# Patient Record
Sex: FEMALE | Race: WHITE | NOT HISPANIC OR LATINO | Employment: UNEMPLOYED | URBAN - METROPOLITAN AREA
[De-identification: names, ages, dates, MRNs, and addresses within clinical notes are randomized per-mention and may not be internally consistent; named-entity substitution may affect disease eponyms.]

---

## 2023-10-03 ENCOUNTER — OFFICE VISIT (OUTPATIENT)
Dept: PRIMARY CARE | Facility: CLINIC | Age: 83
End: 2023-10-03
Payer: MEDICARE

## 2023-10-03 DIAGNOSIS — I48.91 NEW ONSET ATRIAL FIBRILLATION (MULTI): ICD-10-CM

## 2023-10-03 LAB
POC INR: 3 (ref 0.9–1.1)
POC PROTHROMBIN TIME: 36.1 (ref 9.3–12.5)

## 2023-10-03 PROCEDURE — 99212 OFFICE O/P EST SF 10 MIN: CPT

## 2023-10-03 PROCEDURE — 85610 PROTHROMBIN TIME: CPT

## 2023-10-03 RX ORDER — WARFARIN 6 MG/1
6 TABLET ORAL 3 TIMES WEEKLY
COMMUNITY
End: 2023-11-09 | Stop reason: SDUPTHER

## 2023-10-03 RX ORDER — WARFARIN 3 MG/1
3 TABLET ORAL
COMMUNITY
Start: 2023-07-07 | End: 2023-10-16 | Stop reason: SDUPTHER

## 2023-10-03 NOTE — PROGRESS NOTES
Continue current dose of 6 mg every Sunday Tuesday and Friday and 3 mg all other days  
[Negative] : Endocrine

## 2023-10-12 DIAGNOSIS — I48.0 PAROXYSMAL ATRIAL FIBRILLATION (MULTI): ICD-10-CM

## 2023-10-16 RX ORDER — WARFARIN 3 MG/1
3 TABLET ORAL
Qty: 16 TABLET | Refills: 2 | Status: SHIPPED | OUTPATIENT
Start: 2023-10-17 | End: 2023-11-09 | Stop reason: SDUPTHER

## 2023-10-30 PROBLEM — I48.0 PAROXYSMAL ATRIAL FIBRILLATION (MULTI): Status: ACTIVE | Noted: 2023-10-30

## 2023-10-30 PROBLEM — R89.9 ABNORMAL LABORATORY TEST RESULT: Status: ACTIVE | Noted: 2023-10-30

## 2023-10-30 PROBLEM — Z90.710 STATUS POST HYSTERECTOMY: Status: ACTIVE | Noted: 2023-10-30

## 2023-10-30 PROBLEM — E78.5 HYPERLIPIDEMIA: Status: ACTIVE | Noted: 2023-10-30

## 2023-10-30 PROBLEM — E55.9 VITAMIN D DEFICIENCY: Status: ACTIVE | Noted: 2023-10-30

## 2023-10-30 RX ORDER — ALBUTEROL SULFATE 90 UG/1
1 AEROSOL, METERED RESPIRATORY (INHALATION) EVERY 4 HOURS PRN
COMMUNITY

## 2023-10-30 RX ORDER — ACETAMINOPHEN 325 MG/1
2 TABLET ORAL EVERY 6 HOURS PRN
COMMUNITY
End: 2024-05-14 | Stop reason: HOSPADM

## 2023-10-30 RX ORDER — DEXTROMETHORPHAN HYDROBROMIDE, GUAIFENESIN 5; 100 MG/5ML; MG/5ML
2 LIQUID ORAL EVERY 12 HOURS
COMMUNITY
End: 2024-05-14 | Stop reason: HOSPADM

## 2023-10-30 RX ORDER — PREDNISONE 10 MG/1
TABLET ORAL
COMMUNITY
Start: 2023-03-31 | End: 2024-05-13 | Stop reason: ENTERED-IN-ERROR

## 2023-10-30 RX ORDER — METOPROLOL TARTRATE 25 MG/1
0.5 TABLET, FILM COATED ORAL 2 TIMES DAILY
COMMUNITY

## 2023-10-30 RX ORDER — FLUTICASONE FUROATE AND VILANTEROL TRIFENATATE 200; 25 UG/1; UG/1
1 POWDER RESPIRATORY (INHALATION) DAILY
COMMUNITY

## 2023-10-30 RX ORDER — TIOTROPIUM BROMIDE INHALATION SPRAY 3.12 UG/1
SPRAY, METERED RESPIRATORY (INHALATION)
COMMUNITY
Start: 2023-03-31 | End: 2024-05-13 | Stop reason: ENTERED-IN-ERROR

## 2023-10-30 RX ORDER — TRIFLUOPERAZINE HYDROCHLORIDE 5 MG/1
TABLET, FILM COATED ORAL EVERY 24 HOURS
COMMUNITY

## 2023-10-30 RX ORDER — GABAPENTIN 300 MG/1
1 CAPSULE ORAL 2 TIMES DAILY
COMMUNITY

## 2023-10-30 RX ORDER — NORTRIPTYLINE HYDROCHLORIDE 25 MG/1
1 CAPSULE ORAL NIGHTLY
COMMUNITY

## 2023-10-30 RX ORDER — VENLAFAXINE HCL 37.5 MG
1 CAPSULE, EXT RELEASE 24 HR ORAL DAILY
COMMUNITY
End: 2023-11-09 | Stop reason: ALTCHOICE

## 2023-10-31 ENCOUNTER — OFFICE VISIT (OUTPATIENT)
Dept: PRIMARY CARE | Facility: CLINIC | Age: 83
End: 2023-10-31
Payer: MEDICARE

## 2023-10-31 DIAGNOSIS — I48.0 PAROXYSMAL ATRIAL FIBRILLATION (MULTI): ICD-10-CM

## 2023-10-31 LAB
POC INR: 2.2 (ref 0.9–1.1)
POC PROTHROMBIN TIME: 22 (ref 9.3–12.5)

## 2023-10-31 PROCEDURE — 99024 POSTOP FOLLOW-UP VISIT: CPT

## 2023-10-31 RX ORDER — WARFARIN 3 MG/1
3 TABLET ORAL
Qty: 16 TABLET | Refills: 2 | Status: CANCELLED | OUTPATIENT
Start: 2023-10-31 | End: 2024-01-29

## 2023-11-09 ENCOUNTER — OFFICE VISIT (OUTPATIENT)
Dept: PRIMARY CARE | Facility: CLINIC | Age: 83
End: 2023-11-09
Payer: MEDICARE

## 2023-11-09 ENCOUNTER — TELEPHONE (OUTPATIENT)
Dept: PRIMARY CARE | Facility: CLINIC | Age: 83
End: 2023-11-09
Payer: MEDICARE

## 2023-11-09 DIAGNOSIS — I48.0 PAROXYSMAL ATRIAL FIBRILLATION (MULTI): ICD-10-CM

## 2023-11-09 LAB
POC INR: 2.9 (ref 0.9–1.1)
POC PROTHROMBIN TIME: 34.4 (ref 9.3–12.5)

## 2023-11-09 PROCEDURE — 99024 POSTOP FOLLOW-UP VISIT: CPT

## 2023-11-09 RX ORDER — WARFARIN 6 MG/1
6 TABLET ORAL 3 TIMES WEEKLY
Qty: 36 TABLET | Refills: 0 | Status: SHIPPED | OUTPATIENT
Start: 2023-11-10 | End: 2023-12-01

## 2023-11-09 RX ORDER — WARFARIN 3 MG/1
3 TABLET ORAL
Qty: 48 TABLET | Refills: 0 | Status: SHIPPED | OUTPATIENT
Start: 2023-11-09 | End: 2023-12-14

## 2023-11-09 NOTE — TELEPHONE ENCOUNTER
Patient currently takes 6 mg Mon, Wed, Thur and 3 mg all other days. Please advise. Please see Rx tab. Correct pharmacy selected above.   Perilesional Excision Additional Text (Leave Blank If You Do Not Want): The margin was drawn around the clinically apparent lesion. Incisions were then made along these lines to the appropriate tissue plane and the lesion was extirpated.

## 2023-11-28 ENCOUNTER — APPOINTMENT (OUTPATIENT)
Dept: PRIMARY CARE | Facility: CLINIC | Age: 83
End: 2023-11-28
Payer: MEDICARE

## 2023-11-29 DIAGNOSIS — R42 DIZZINESS: Primary | ICD-10-CM

## 2023-11-29 RX ORDER — MECLIZINE HYDROCHLORIDE 25 MG/1
25 TABLET ORAL 3 TIMES DAILY PRN
COMMUNITY
End: 2023-11-29 | Stop reason: SDUPTHER

## 2023-11-30 RX ORDER — MECLIZINE HYDROCHLORIDE 25 MG/1
25 TABLET ORAL DAILY PRN
Qty: 30 TABLET | Refills: 1 | Status: SHIPPED | OUTPATIENT
Start: 2023-11-30 | End: 2023-12-01 | Stop reason: SDUPTHER

## 2023-12-01 ENCOUNTER — APPOINTMENT (OUTPATIENT)
Dept: PRIMARY CARE | Facility: CLINIC | Age: 83
End: 2023-12-01
Payer: MEDICARE

## 2023-12-01 ENCOUNTER — CLINICAL SUPPORT (OUTPATIENT)
Dept: PRIMARY CARE | Facility: CLINIC | Age: 83
End: 2023-12-01
Payer: MEDICARE

## 2023-12-01 DIAGNOSIS — I48.0 PAROXYSMAL ATRIAL FIBRILLATION (MULTI): ICD-10-CM

## 2023-12-01 DIAGNOSIS — R42 DIZZINESS: ICD-10-CM

## 2023-12-01 LAB
POC INR: 3.8 (ref 0.9–1.1)
POC PROTHROMBIN TIME: 45.4 (ref 9.3–12.5)

## 2023-12-01 RX ORDER — WARFARIN 6 MG/1
TABLET ORAL
Qty: 36 TABLET | Refills: 3 | Status: SHIPPED | OUTPATIENT
Start: 2023-12-01

## 2023-12-01 RX ORDER — MECLIZINE HYDROCHLORIDE 25 MG/1
25 TABLET ORAL DAILY PRN
Qty: 90 TABLET | Refills: 1 | Status: SHIPPED | OUTPATIENT
Start: 2023-12-01 | End: 2024-05-29

## 2023-12-14 DIAGNOSIS — I48.0 PAROXYSMAL ATRIAL FIBRILLATION (MULTI): ICD-10-CM

## 2023-12-14 RX ORDER — WARFARIN 3 MG/1
TABLET ORAL
Qty: 48 TABLET | Refills: 3 | Status: SHIPPED | OUTPATIENT
Start: 2023-12-14

## 2024-02-15 ENCOUNTER — APPOINTMENT (OUTPATIENT)
Dept: PRIMARY CARE | Facility: CLINIC | Age: 84
End: 2024-02-15
Payer: MEDICARE

## 2024-02-27 ENCOUNTER — TELEPHONE (OUTPATIENT)
Dept: PRIMARY CARE | Facility: CLINIC | Age: 84
End: 2024-02-27

## 2024-02-27 ENCOUNTER — CLINICAL SUPPORT (OUTPATIENT)
Dept: PRIMARY CARE | Facility: CLINIC | Age: 84
End: 2024-02-27
Payer: MEDICARE

## 2024-02-27 DIAGNOSIS — I48.0 PAROXYSMAL ATRIAL FIBRILLATION (MULTI): ICD-10-CM

## 2024-02-27 LAB
POC INR: 2.5 (ref 0.9–1.1)
POC PROTHROMBIN TIME: 29.5 (ref 9.3–12.5)

## 2024-02-27 PROCEDURE — 85610 PROTHROMBIN TIME: CPT | Mod: QW

## 2024-02-27 PROCEDURE — 85610 PROTHROMBIN TIME: CPT

## 2024-02-27 NOTE — TELEPHONE ENCOUNTER
Patient here today for PT/INR check. I have documented for the nurse visit. Her INR is 2.5. She is currently taking 6 mg Mon, Wed, Thur and 3 mg all other days. I advised her and her daughter to continue the current dosage.

## 2024-02-28 NOTE — TELEPHONE ENCOUNTER
I spoke with the patient's  and let him know. He said that he will get her in as close to a week as possible because it depends on how she is feeling.

## 2024-03-07 ENCOUNTER — CLINICAL SUPPORT (OUTPATIENT)
Dept: PRIMARY CARE | Facility: CLINIC | Age: 84
End: 2024-03-07
Payer: MEDICARE

## 2024-03-07 ENCOUNTER — TELEPHONE (OUTPATIENT)
Dept: PRIMARY CARE | Facility: CLINIC | Age: 84
End: 2024-03-07

## 2024-03-07 DIAGNOSIS — I48.0 PAROXYSMAL ATRIAL FIBRILLATION (MULTI): ICD-10-CM

## 2024-03-07 LAB
POC INR: 3.8 (ref 0.9–1.1)
POC PROTHROMBIN TIME: 45.1 (ref 9.3–12.5)

## 2024-03-07 PROCEDURE — 85610 PROTHROMBIN TIME: CPT

## 2024-03-07 PROCEDURE — 85610 PROTHROMBIN TIME: CPT | Mod: QW

## 2024-03-07 NOTE — TELEPHONE ENCOUNTER
Patient's current today is 3.8. Current dosage of coumadin is 6 mg M, W, Th and 3 mg Tue, Fri, Sat, Sun.

## 2024-03-15 ENCOUNTER — CLINICAL SUPPORT (OUTPATIENT)
Dept: PRIMARY CARE | Facility: CLINIC | Age: 84
End: 2024-03-15
Payer: MEDICARE

## 2024-03-15 ENCOUNTER — TELEPHONE (OUTPATIENT)
Dept: PRIMARY CARE | Facility: CLINIC | Age: 84
End: 2024-03-15

## 2024-03-15 DIAGNOSIS — I48.0 PAROXYSMAL ATRIAL FIBRILLATION (MULTI): ICD-10-CM

## 2024-03-15 LAB
POC INR: 2 (ref 0.9–1.1)
POC PROTHROMBIN TIME: 23.9 (ref 9.3–12.5)

## 2024-03-15 PROCEDURE — 85610 PROTHROMBIN TIME: CPT

## 2024-03-15 PROCEDURE — 85610 PROTHROMBIN TIME: CPT | Mod: QW

## 2024-03-15 NOTE — PROGRESS NOTES
PATIENT CAME IN FOR INR CHECK . LAST WEEK HER INR WAS 3.8 SHE STATED SHE WAS NOT NOTIFIED BY THE OFFICE WHAT TO DO, SO HER  DECIDED TO HOLD HER DOSE FRIDAY AND SAY.

## 2024-03-27 ENCOUNTER — CLINICAL SUPPORT (OUTPATIENT)
Dept: PRIMARY CARE | Facility: CLINIC | Age: 84
End: 2024-03-27
Payer: MEDICARE

## 2024-03-27 DIAGNOSIS — I48.0 PAROXYSMAL ATRIAL FIBRILLATION (MULTI): ICD-10-CM

## 2024-03-27 LAB — POC INR: 3.7 (ref 0.9–1.1)

## 2024-03-27 PROCEDURE — 85610 PROTHROMBIN TIME: CPT

## 2024-03-27 PROCEDURE — 85610 PROTHROMBIN TIME: CPT | Mod: QW

## 2024-04-04 ENCOUNTER — CLINICAL SUPPORT (OUTPATIENT)
Dept: PRIMARY CARE | Facility: CLINIC | Age: 84
End: 2024-04-04
Payer: MEDICARE

## 2024-04-04 DIAGNOSIS — I48.0 PAROXYSMAL ATRIAL FIBRILLATION (MULTI): ICD-10-CM

## 2024-04-04 LAB
POC INR: 2.8 (ref 0.9–1.1)
POC PROTHROMBIN TIME: 33.2 (ref 9.3–12.5)

## 2024-04-04 PROCEDURE — 85610 PROTHROMBIN TIME: CPT | Mod: QW

## 2024-04-04 PROCEDURE — 85610 PROTHROMBIN TIME: CPT

## 2024-04-11 ENCOUNTER — APPOINTMENT (OUTPATIENT)
Dept: PRIMARY CARE | Facility: CLINIC | Age: 84
End: 2024-04-11
Payer: MEDICARE

## 2024-04-11 ENCOUNTER — TELEPHONE (OUTPATIENT)
Dept: PRIMARY CARE | Facility: CLINIC | Age: 84
End: 2024-04-11

## 2024-04-11 DIAGNOSIS — I48.0 PAROXYSMAL ATRIAL FIBRILLATION (MULTI): ICD-10-CM

## 2024-04-11 LAB
POC INR: 3.3 (ref 0.9–1.1)
POC PROTHROMBIN TIME: 39.9 (ref 9.3–12.5)

## 2024-04-11 PROCEDURE — 85610 PROTHROMBIN TIME: CPT

## 2024-04-11 NOTE — TELEPHONE ENCOUNTER
PT/INR today is 3.3 Current dosage is 6 mg Mon, Wed, Thu and 3 mg Tue, Fri, Sat, Sun. She held one single 6 mg dosage last week as instructed. Please advise.   show

## 2024-04-18 ENCOUNTER — CLINICAL SUPPORT (OUTPATIENT)
Dept: PRIMARY CARE | Facility: CLINIC | Age: 84
End: 2024-04-18
Payer: MEDICARE

## 2024-04-18 DIAGNOSIS — I48.0 PAROXYSMAL ATRIAL FIBRILLATION (MULTI): ICD-10-CM

## 2024-04-18 LAB
POC INR: 3.2 (ref 0.9–1.1)
POC PROTHROMBIN TIME: 38 (ref 9.3–12.5)

## 2024-04-18 PROCEDURE — 85610 PROTHROMBIN TIME: CPT

## 2024-04-18 PROCEDURE — 85610 PROTHROMBIN TIME: CPT | Mod: QW

## 2024-04-25 ENCOUNTER — OFFICE VISIT (OUTPATIENT)
Dept: PRIMARY CARE | Facility: CLINIC | Age: 84
End: 2024-04-25
Payer: MEDICARE

## 2024-04-25 DIAGNOSIS — I48.0 PAROXYSMAL ATRIAL FIBRILLATION (MULTI): ICD-10-CM

## 2024-04-25 LAB
POC INR: 2.7 (ref 0.9–1.1)
POC PROTHROMBIN TIME: 32.8 (ref 9.3–12.5)

## 2024-04-25 PROCEDURE — 1157F ADVNC CARE PLAN IN RCRD: CPT

## 2024-04-25 PROCEDURE — 99024 POSTOP FOLLOW-UP VISIT: CPT

## 2024-04-25 PROCEDURE — 85610 PROTHROMBIN TIME: CPT

## 2024-04-25 PROCEDURE — 85610 PROTHROMBIN TIME: CPT | Mod: QW

## 2024-05-02 ENCOUNTER — LAB (OUTPATIENT)
Dept: LAB | Facility: LAB | Age: 84
End: 2024-05-02
Payer: MEDICARE

## 2024-05-02 ENCOUNTER — OFFICE VISIT (OUTPATIENT)
Dept: PRIMARY CARE | Facility: CLINIC | Age: 84
End: 2024-05-02
Payer: MEDICARE

## 2024-05-02 DIAGNOSIS — I48.0 PAROXYSMAL ATRIAL FIBRILLATION (MULTI): ICD-10-CM

## 2024-05-02 DIAGNOSIS — Z79.01 LONG TERM CURRENT USE OF ANTICOAGULANT: ICD-10-CM

## 2024-05-02 LAB
INR PPP: 2.9 (ref 0.9–1.2)
PROTHROMBIN TIME: 28.7 SECONDS (ref 9.3–12.7)

## 2024-05-02 PROCEDURE — 99024 POSTOP FOLLOW-UP VISIT: CPT

## 2024-05-02 PROCEDURE — 1157F ADVNC CARE PLAN IN RCRD: CPT

## 2024-05-02 PROCEDURE — 85610 PROTHROMBIN TIME: CPT

## 2024-05-02 PROCEDURE — 36415 COLL VENOUS BLD VENIPUNCTURE: CPT

## 2024-05-09 ENCOUNTER — OFFICE VISIT (OUTPATIENT)
Dept: PRIMARY CARE | Facility: CLINIC | Age: 84
End: 2024-05-09
Payer: MEDICARE

## 2024-05-09 DIAGNOSIS — N18.30 STAGE 3 CHRONIC KIDNEY DISEASE, UNSPECIFIED WHETHER STAGE 3A OR 3B CKD (MULTI): ICD-10-CM

## 2024-05-09 DIAGNOSIS — I48.0 PAROXYSMAL ATRIAL FIBRILLATION (MULTI): ICD-10-CM

## 2024-05-09 DIAGNOSIS — E66.01 OBESITY, MORBID (MULTI): ICD-10-CM

## 2024-05-09 DIAGNOSIS — Z79.01 LONG TERM CURRENT USE OF ANTICOAGULANT: ICD-10-CM

## 2024-05-09 DIAGNOSIS — F20.9 SCHIZOPHRENIA, UNSPECIFIED TYPE (MULTI): Primary | ICD-10-CM

## 2024-05-09 LAB
POC INR: 3 (ref 0.9–1.1)
POC PROTHROMBIN TIME: 36.3 (ref 9.3–12.5)

## 2024-05-09 PROCEDURE — 85610 PROTHROMBIN TIME: CPT | Mod: QW

## 2024-05-09 PROCEDURE — 99024 POSTOP FOLLOW-UP VISIT: CPT

## 2024-05-09 PROCEDURE — 1157F ADVNC CARE PLAN IN RCRD: CPT

## 2024-05-09 PROCEDURE — 85610 PROTHROMBIN TIME: CPT

## 2024-05-12 ENCOUNTER — APPOINTMENT (OUTPATIENT)
Dept: RADIOLOGY | Facility: HOSPITAL | Age: 84
End: 2024-05-12
Payer: MEDICARE

## 2024-05-12 ENCOUNTER — APPOINTMENT (OUTPATIENT)
Dept: CARDIOLOGY | Facility: HOSPITAL | Age: 84
End: 2024-05-12
Payer: MEDICARE

## 2024-05-12 ENCOUNTER — HOSPITAL ENCOUNTER (OUTPATIENT)
Facility: HOSPITAL | Age: 84
Setting detail: OBSERVATION
Discharge: HOME HEALTH | End: 2024-05-14
Attending: EMERGENCY MEDICINE | Admitting: INTERNAL MEDICINE
Payer: MEDICARE

## 2024-05-12 DIAGNOSIS — N18.30 STAGE 3 CHRONIC KIDNEY DISEASE, UNSPECIFIED WHETHER STAGE 3A OR 3B CKD (MULTI): ICD-10-CM

## 2024-05-12 DIAGNOSIS — E66.01 OBESITY, MORBID (MULTI): ICD-10-CM

## 2024-05-12 DIAGNOSIS — R42 VERTIGO: ICD-10-CM

## 2024-05-12 DIAGNOSIS — N30.00 ACUTE CYSTITIS WITHOUT HEMATURIA: ICD-10-CM

## 2024-05-12 DIAGNOSIS — R51.9 NONINTRACTABLE HEADACHE, UNSPECIFIED CHRONICITY PATTERN, UNSPECIFIED HEADACHE TYPE: Primary | ICD-10-CM

## 2024-05-12 DIAGNOSIS — N17.9 AKI (ACUTE KIDNEY INJURY) (CMS-HCC): ICD-10-CM

## 2024-05-12 DIAGNOSIS — N28.9 ACUTE RENAL INSUFFICIENCY: ICD-10-CM

## 2024-05-12 LAB
ALBUMIN SERPL-MCNC: 4.1 G/DL (ref 3.5–5)
ALP BLD-CCNC: 100 U/L (ref 35–125)
ALT SERPL-CCNC: 18 U/L (ref 5–40)
ANION GAP SERPL CALC-SCNC: 16 MMOL/L
APPEARANCE UR: ABNORMAL
APTT PPP: 43.8 SECONDS (ref 22–32.5)
AST SERPL-CCNC: 28 U/L (ref 5–40)
BASOPHILS # BLD AUTO: 0.07 X10*3/UL (ref 0–0.1)
BASOPHILS NFR BLD AUTO: 0.6 %
BILIRUB SERPL-MCNC: 0.4 MG/DL (ref 0.1–1.2)
BILIRUB UR STRIP.AUTO-MCNC: NEGATIVE MG/DL
BUN SERPL-MCNC: 16 MG/DL (ref 8–25)
CALCIUM SERPL-MCNC: 9.4 MG/DL (ref 8.5–10.4)
CAOX CRY #/AREA UR COMP ASSIST: ABNORMAL /HPF
CHLORIDE SERPL-SCNC: 101 MMOL/L (ref 97–107)
CO2 SERPL-SCNC: 25 MMOL/L (ref 24–31)
COLOR UR: YELLOW
CREAT SERPL-MCNC: 1.8 MG/DL (ref 0.4–1.6)
EGFRCR SERPLBLD CKD-EPI 2021: 28 ML/MIN/1.73M*2
EOSINOPHIL # BLD AUTO: 0.06 X10*3/UL (ref 0–0.4)
EOSINOPHIL NFR BLD AUTO: 0.5 %
ERYTHROCYTE [DISTWIDTH] IN BLOOD BY AUTOMATED COUNT: 15.9 % (ref 11.5–14.5)
GLUCOSE SERPL-MCNC: 147 MG/DL (ref 65–99)
GLUCOSE UR STRIP.AUTO-MCNC: NORMAL MG/DL
HCT VFR BLD AUTO: 46.8 % (ref 36–46)
HGB BLD-MCNC: 14.5 G/DL (ref 12–16)
HYALINE CASTS #/AREA URNS AUTO: ABNORMAL /LPF
IMM GRANULOCYTES # BLD AUTO: 0.05 X10*3/UL (ref 0–0.5)
IMM GRANULOCYTES NFR BLD AUTO: 0.4 % (ref 0–0.9)
INR PPP: 3.1 (ref 0.9–1.2)
KETONES UR STRIP.AUTO-MCNC: NEGATIVE MG/DL
LACTATE BLDV-SCNC: 1.6 MMOL/L (ref 0.4–2)
LACTATE BLDV-SCNC: 2.6 MMOL/L (ref 0.4–2)
LEUKOCYTE ESTERASE UR QL STRIP.AUTO: ABNORMAL
LYMPHOCYTES # BLD AUTO: 0.98 X10*3/UL (ref 0.8–3)
LYMPHOCYTES NFR BLD AUTO: 8.6 %
MCH RBC QN AUTO: 29.3 PG (ref 26–34)
MCHC RBC AUTO-ENTMCNC: 31 G/DL (ref 32–36)
MCV RBC AUTO: 95 FL (ref 80–100)
MONOCYTES # BLD AUTO: 0.31 X10*3/UL (ref 0.05–0.8)
MONOCYTES NFR BLD AUTO: 2.7 %
MUCOUS THREADS #/AREA URNS AUTO: ABNORMAL /LPF
NEUTROPHILS # BLD AUTO: 9.86 X10*3/UL (ref 1.6–5.5)
NEUTROPHILS NFR BLD AUTO: 87.2 %
NITRITE UR QL STRIP.AUTO: NEGATIVE
NRBC BLD-RTO: 0 /100 WBCS (ref 0–0)
PH UR STRIP.AUTO: 5.5 [PH]
PLATELET # BLD AUTO: 431 X10*3/UL (ref 150–450)
POTASSIUM SERPL-SCNC: 3.7 MMOL/L (ref 3.4–5.1)
PROT SERPL-MCNC: 7.1 G/DL (ref 5.9–7.9)
PROT UR STRIP.AUTO-MCNC: ABNORMAL MG/DL
PROTHROMBIN TIME: 30.3 SECONDS (ref 9.3–12.7)
RBC # BLD AUTO: 4.95 X10*6/UL (ref 4–5.2)
RBC # UR STRIP.AUTO: NEGATIVE /UL
RBC #/AREA URNS AUTO: ABNORMAL /HPF
SODIUM SERPL-SCNC: 142 MMOL/L (ref 133–145)
SP GR UR STRIP.AUTO: 1.03
SQUAMOUS #/AREA URNS AUTO: ABNORMAL /HPF
TROPONIN T SERPL-MCNC: 19 NG/L
TROPONIN T SERPL-MCNC: 26 NG/L
UROBILINOGEN UR STRIP.AUTO-MCNC: ABNORMAL MG/DL
WBC # BLD AUTO: 11.3 X10*3/UL (ref 4.4–11.3)
WBC #/AREA URNS AUTO: ABNORMAL /HPF

## 2024-05-12 PROCEDURE — 2500000001 HC RX 250 WO HCPCS SELF ADMINISTERED DRUGS (ALT 637 FOR MEDICARE OP): Performed by: PHYSICIAN ASSISTANT

## 2024-05-12 PROCEDURE — 81001 URINALYSIS AUTO W/SCOPE: CPT | Performed by: PHYSICIAN ASSISTANT

## 2024-05-12 PROCEDURE — 2500000004 HC RX 250 GENERAL PHARMACY W/ HCPCS (ALT 636 FOR OP/ED): Performed by: PHYSICIAN ASSISTANT

## 2024-05-12 PROCEDURE — 85730 THROMBOPLASTIN TIME PARTIAL: CPT | Performed by: PHYSICIAN ASSISTANT

## 2024-05-12 PROCEDURE — 83605 ASSAY OF LACTIC ACID: CPT | Performed by: EMERGENCY MEDICINE

## 2024-05-12 PROCEDURE — 99285 EMERGENCY DEPT VISIT HI MDM: CPT | Mod: 25

## 2024-05-12 PROCEDURE — 84484 ASSAY OF TROPONIN QUANT: CPT | Performed by: PHYSICIAN ASSISTANT

## 2024-05-12 PROCEDURE — 36415 COLL VENOUS BLD VENIPUNCTURE: CPT | Performed by: EMERGENCY MEDICINE

## 2024-05-12 PROCEDURE — 2500000001 HC RX 250 WO HCPCS SELF ADMINISTERED DRUGS (ALT 637 FOR MEDICARE OP): Performed by: INTERNAL MEDICINE

## 2024-05-12 PROCEDURE — G0378 HOSPITAL OBSERVATION PER HR: HCPCS

## 2024-05-12 PROCEDURE — 85025 COMPLETE CBC W/AUTO DIFF WBC: CPT | Performed by: PHYSICIAN ASSISTANT

## 2024-05-12 PROCEDURE — 71045 X-RAY EXAM CHEST 1 VIEW: CPT

## 2024-05-12 PROCEDURE — 96375 TX/PRO/DX INJ NEW DRUG ADDON: CPT

## 2024-05-12 PROCEDURE — 96365 THER/PROPH/DIAG IV INF INIT: CPT

## 2024-05-12 PROCEDURE — 71045 X-RAY EXAM CHEST 1 VIEW: CPT | Performed by: STUDENT IN AN ORGANIZED HEALTH CARE EDUCATION/TRAINING PROGRAM

## 2024-05-12 PROCEDURE — 70450 CT HEAD/BRAIN W/O DYE: CPT

## 2024-05-12 PROCEDURE — 2500000004 HC RX 250 GENERAL PHARMACY W/ HCPCS (ALT 636 FOR OP/ED): Performed by: INTERNAL MEDICINE

## 2024-05-12 PROCEDURE — 93005 ELECTROCARDIOGRAM TRACING: CPT

## 2024-05-12 PROCEDURE — 96361 HYDRATE IV INFUSION ADD-ON: CPT

## 2024-05-12 PROCEDURE — 94640 AIRWAY INHALATION TREATMENT: CPT

## 2024-05-12 PROCEDURE — 85610 PROTHROMBIN TIME: CPT | Performed by: PHYSICIAN ASSISTANT

## 2024-05-12 PROCEDURE — 36415 COLL VENOUS BLD VENIPUNCTURE: CPT | Performed by: PHYSICIAN ASSISTANT

## 2024-05-12 PROCEDURE — 87086 URINE CULTURE/COLONY COUNT: CPT | Mod: WESLAB | Performed by: PHYSICIAN ASSISTANT

## 2024-05-12 PROCEDURE — 80053 COMPREHEN METABOLIC PANEL: CPT | Performed by: PHYSICIAN ASSISTANT

## 2024-05-12 PROCEDURE — 70450 CT HEAD/BRAIN W/O DYE: CPT | Performed by: RADIOLOGY

## 2024-05-12 RX ORDER — TRIFLUOPERAZINE HYDROCHLORIDE 5 MG/1
5 TABLET, FILM COATED ORAL NIGHTLY
Status: DISCONTINUED | OUTPATIENT
Start: 2024-05-12 | End: 2024-05-14 | Stop reason: HOSPADM

## 2024-05-12 RX ORDER — ACETAMINOPHEN 325 MG/1
650 TABLET ORAL EVERY 4 HOURS PRN
Status: DISCONTINUED | OUTPATIENT
Start: 2024-05-12 | End: 2024-05-14 | Stop reason: HOSPADM

## 2024-05-12 RX ORDER — CEFTRIAXONE 1 G/50ML
1 INJECTION, SOLUTION INTRAVENOUS
Status: DISCONTINUED | OUTPATIENT
Start: 2024-05-12 | End: 2024-05-14 | Stop reason: HOSPADM

## 2024-05-12 RX ORDER — IPRATROPIUM BROMIDE AND ALBUTEROL SULFATE 2.5; .5 MG/3ML; MG/3ML
3 SOLUTION RESPIRATORY (INHALATION) EVERY 2 HOUR PRN
Status: DISCONTINUED | OUTPATIENT
Start: 2024-05-12 | End: 2024-05-14 | Stop reason: HOSPADM

## 2024-05-12 RX ORDER — ONDANSETRON 4 MG/1
4 TABLET, ORALLY DISINTEGRATING ORAL EVERY 8 HOURS PRN
Status: DISCONTINUED | OUTPATIENT
Start: 2024-05-12 | End: 2024-05-14 | Stop reason: HOSPADM

## 2024-05-12 RX ORDER — DIPHENHYDRAMINE HYDROCHLORIDE 50 MG/ML
25 INJECTION INTRAMUSCULAR; INTRAVENOUS ONCE
Status: COMPLETED | OUTPATIENT
Start: 2024-05-12 | End: 2024-05-12

## 2024-05-12 RX ORDER — ACETAMINOPHEN 500 MG
5 TABLET ORAL NIGHTLY PRN
Status: DISCONTINUED | OUTPATIENT
Start: 2024-05-12 | End: 2024-05-14 | Stop reason: HOSPADM

## 2024-05-12 RX ORDER — SODIUM CHLORIDE 9 MG/ML
100 INJECTION, SOLUTION INTRAVENOUS CONTINUOUS
Status: DISCONTINUED | OUTPATIENT
Start: 2024-05-12 | End: 2024-05-14 | Stop reason: HOSPADM

## 2024-05-12 RX ORDER — GUAIFENESIN 600 MG/1
600 TABLET, EXTENDED RELEASE ORAL EVERY 12 HOURS PRN
Status: DISCONTINUED | OUTPATIENT
Start: 2024-05-12 | End: 2024-05-14 | Stop reason: HOSPADM

## 2024-05-12 RX ORDER — MECLIZINE HYDROCHLORIDE 25 MG/1
25 TABLET ORAL DAILY PRN
Status: DISCONTINUED | OUTPATIENT
Start: 2024-05-12 | End: 2024-05-14 | Stop reason: HOSPADM

## 2024-05-12 RX ORDER — NORTRIPTYLINE HYDROCHLORIDE 25 MG/1
25 CAPSULE ORAL DAILY
Status: DISCONTINUED | OUTPATIENT
Start: 2024-05-13 | End: 2024-05-14 | Stop reason: HOSPADM

## 2024-05-12 RX ORDER — ACETAMINOPHEN 325 MG/1
975 TABLET ORAL ONCE
Status: COMPLETED | OUTPATIENT
Start: 2024-05-12 | End: 2024-05-12

## 2024-05-12 RX ORDER — METOPROLOL TARTRATE 25 MG/1
12.5 TABLET, FILM COATED ORAL 2 TIMES DAILY
Status: DISCONTINUED | OUTPATIENT
Start: 2024-05-12 | End: 2024-05-14 | Stop reason: HOSPADM

## 2024-05-12 RX ORDER — GABAPENTIN 100 MG/1
200 CAPSULE ORAL 3 TIMES DAILY
Status: DISCONTINUED | OUTPATIENT
Start: 2024-05-12 | End: 2024-05-13

## 2024-05-12 RX ORDER — PROCHLORPERAZINE EDISYLATE 5 MG/ML
10 INJECTION INTRAMUSCULAR; INTRAVENOUS ONCE
Status: COMPLETED | OUTPATIENT
Start: 2024-05-12 | End: 2024-05-12

## 2024-05-12 RX ORDER — ONDANSETRON HYDROCHLORIDE 2 MG/ML
4 INJECTION, SOLUTION INTRAVENOUS ONCE
Status: COMPLETED | OUTPATIENT
Start: 2024-05-12 | End: 2024-05-12

## 2024-05-12 RX ORDER — MECLIZINE HYDROCHLORIDE 25 MG/1
25 TABLET ORAL ONCE
Status: COMPLETED | OUTPATIENT
Start: 2024-05-12 | End: 2024-05-12

## 2024-05-12 RX ORDER — FLUTICASONE FUROATE AND VILANTEROL 200; 25 UG/1; UG/1
1 POWDER RESPIRATORY (INHALATION)
Status: DISCONTINUED | OUTPATIENT
Start: 2024-05-13 | End: 2024-05-14 | Stop reason: HOSPADM

## 2024-05-12 RX ORDER — WARFARIN 3 MG/1
3 TABLET ORAL
Status: DISCONTINUED | OUTPATIENT
Start: 2024-05-13 | End: 2024-05-14 | Stop reason: HOSPADM

## 2024-05-12 RX ORDER — ONDANSETRON HYDROCHLORIDE 2 MG/ML
4 INJECTION, SOLUTION INTRAVENOUS EVERY 8 HOURS PRN
Status: DISCONTINUED | OUTPATIENT
Start: 2024-05-12 | End: 2024-05-14 | Stop reason: HOSPADM

## 2024-05-12 RX ORDER — ACETAMINOPHEN 650 MG/1
650 SUPPOSITORY RECTAL EVERY 4 HOURS PRN
Status: DISCONTINUED | OUTPATIENT
Start: 2024-05-12 | End: 2024-05-14 | Stop reason: HOSPADM

## 2024-05-12 RX ORDER — ACETAMINOPHEN 160 MG/5ML
650 SOLUTION ORAL EVERY 4 HOURS PRN
Status: DISCONTINUED | OUTPATIENT
Start: 2024-05-12 | End: 2024-05-14 | Stop reason: HOSPADM

## 2024-05-12 RX ORDER — POLYETHYLENE GLYCOL 3350 17 G/17G
17 POWDER, FOR SOLUTION ORAL DAILY
Status: DISCONTINUED | OUTPATIENT
Start: 2024-05-13 | End: 2024-05-12

## 2024-05-12 RX ADMIN — ONDANSETRON 4 MG: 2 INJECTION INTRAMUSCULAR; INTRAVENOUS at 17:09

## 2024-05-12 RX ADMIN — TRIFLUOPERAZINE HYDROCHLORIDE 5 MG: 5 TABLET, FILM COATED ORAL at 23:39

## 2024-05-12 RX ADMIN — PROCHLORPERAZINE EDISYLATE 10 MG: 5 INJECTION INTRAMUSCULAR; INTRAVENOUS at 17:09

## 2024-05-12 RX ADMIN — GABAPENTIN 200 MG: 100 CAPSULE ORAL at 23:35

## 2024-05-12 RX ADMIN — DIPHENHYDRAMINE HYDROCHLORIDE 25 MG: 50 INJECTION, SOLUTION INTRAMUSCULAR; INTRAVENOUS at 17:13

## 2024-05-12 RX ADMIN — Medication 5 MG: at 23:35

## 2024-05-12 RX ADMIN — CEFTRIAXONE SODIUM 1 G: 1 INJECTION, SOLUTION INTRAVENOUS at 21:57

## 2024-05-12 RX ADMIN — METOPROLOL TARTRATE 12.5 MG: 25 TABLET, FILM COATED ORAL at 23:38

## 2024-05-12 RX ADMIN — SODIUM CHLORIDE 100 ML/HR: 900 INJECTION, SOLUTION INTRAVENOUS at 21:48

## 2024-05-12 RX ADMIN — ACETAMINOPHEN 975 MG: 325 TABLET ORAL at 17:09

## 2024-05-12 RX ADMIN — SODIUM CHLORIDE 1000 ML: 900 INJECTION, SOLUTION INTRAVENOUS at 17:10

## 2024-05-12 RX ADMIN — MECLIZINE HYDROCHLORIDE 25 MG: 25 TABLET ORAL at 17:09

## 2024-05-12 ASSESSMENT — COLUMBIA-SUICIDE SEVERITY RATING SCALE - C-SSRS
6. HAVE YOU EVER DONE ANYTHING, STARTED TO DO ANYTHING, OR PREPARED TO DO ANYTHING TO END YOUR LIFE?: NO
2. HAVE YOU ACTUALLY HAD ANY THOUGHTS OF KILLING YOURSELF?: NO
1. IN THE PAST MONTH, HAVE YOU WISHED YOU WERE DEAD OR WISHED YOU COULD GO TO SLEEP AND NOT WAKE UP?: NO

## 2024-05-12 NOTE — PROGRESS NOTES
Attestation/Supervisory note for CHARISSE Cruz      The patient is an 83-year-old female presenting to the emergency department for evaluation of feeling lightheaded, generalized malaise, generalized fatigue, headache, and vertigo.  She states that the vertigo is a chronic issue.  She states it may be slightly worse today but it is no different in her symptoms otherwise.  She states that the headache is also a chronic symptom but it worsened today.  She states that it started several hours prior to arrival.  She states that she had a posterior headache that moved to the top of her head.  No better or worse.  It was fairly constant but did improve somewhat prior to arrival.  She denies having any visual changes but her daughter reports that she told her that her vision was fading in and out.  She denies any difficulty speaking or swallowing.  She denies any neck or back pain.  She denies any chest pain or shortness of breath.  No abdominal pain.  She denies any nausea, vomiting or diarrhea.  No urinary complaints.  No fever or chills.  No cough or congestion.  No focal weakness or numbness.  All pertinent positives and negatives are recorded above.  All other systems reviewed and otherwise negative.  Vital signs with hypotension but otherwise within normal limits.  Physical exam with a well-nourished well-developed female in no acute distress.  HEENT exam with dry mucous membranes but otherwise unremarkable.  She has no evidence of airway compromise or respiratory distress.  Abdominal exam is benign.  She has no gross motor, neurologic or vascular deficits on exam.  NIH stroke scale score of 0.      EKG with normal sinus rhythm at 93 bpm, normal axis, normal voltage, borderline prolongation of the QT interval, normal ST segment, normal T waves      IV fluids, oral acetaminophen, IV Benadryl, IV Zofran, ordered.      Diagnostic labs with a therapeutic INR, mild renal insufficiency, borderline Troponin T but otherwise  unremarkable.      UA results were pending at the time of admission      Initial Troponin T 26.  Repeat troponin T pending at the time of admission      XR chest 1 view   Final Result   1.  No evidence of acute cardiopulmonary process.             Signed by: Vinay Arcos 5/12/2024 6:03 PM   Dictation workstation:   [id number]      CT head wo IV contrast   Final Result   Mild age related degenerative change as described without acute   findings or significant change from the prior exam.        Signed by: George Read 5/12/2024 4:57 PM   Dictation workstation:   [id number]           The patient does not have any evidence of ischemia on EKG. her Troponin T is mildly elevated.  No events on telemetry.  She does not have any gross motor, neurologic or vascular deficits on exam.  tPA/TNK is not indicated given NIH stroke scale score of 0.  The chest x-ray shows no acute process.  No evidence of pneumonia or pneumothorax.  No widening of the mediastinum.  No evidence of CHF.  CT head without evidence of intracranial hemorrhage or mass effect.      The patient was admitted for further management and trending of her cardiac enzymes.      Impression/diagnosis:  1.  Headache, posterior  2.  Vertigo, chronic  3.  Malaise and fatigue  4.  Acute renal insufficiency      I personally saw the patient and made/approve the management plan and take responsibility for the patient management.      I independently interpreted the following study (S): EKG and diagnostic labs      I personally discussed the patient's management with the patient and her family      I reviewed the results of the diagnostic labs and diagnostic imaging.  Formal radiology read was completed by the radiologist.      Rufina James MD

## 2024-05-12 NOTE — ED PROVIDER NOTES
HPI   Chief Complaint   Patient presents with    Dizziness     Suddenly got a massive headache 30 minutes ago and dizziness. Never had this happen before. Was struggling to have a BM at home and wasn't able to go.    Headache       HPI     Patient is an 83-year-old female presenting for evaluation of a headache with dizziness.  Patient states she was going to the bathroom approximately 45 minutes ago and all of a sudden she developed a headache to the back portion of her head that radiates forward towards the front portion of her head.  Patient denies trauma to her head.  She denies any falls.  Patient is on blood thinning medication.  Patient states she also feels dizzy as if the room is spinning, however she has chronic dizziness issues according to her  at the bedside.  She states the dizziness is worse than her normal.  She denies vision changes, chest pain, shortness breath, abdominal pain, nausea, vomiting, diarrhea or constipation.               Moris Coma Scale Score: 15                     Patient History   Past Medical History:   Diagnosis Date    Other specified anxiety disorders     Depression with anxiety    Personal history of diseases of the blood and blood-forming organs and certain disorders involving the immune mechanism     History of autoimmune disorder    Personal history of other endocrine, nutritional and metabolic disease     History of high cholesterol     Past Surgical History:   Procedure Laterality Date    OTHER SURGICAL HISTORY  08/23/2021    Hysterectomy    OTHER SURGICAL HISTORY  08/23/2021    Hip surgery    OTHER SURGICAL HISTORY  08/23/2021    Tonsillectomy     Family History   Problem Relation Name Age of Onset    Heart attack Mother      Heart disease Mother      Heart attack Father      Heart disease Father       Social History     Tobacco Use    Smoking status: Not on file    Smokeless tobacco: Not on file   Substance Use Topics    Alcohol use: Not on file    Drug use:  Not on file       Physical Exam   ED Triage Vitals [05/12/24 1550]   Temperature Heart Rate Respirations BP   36.2 °C (97.2 °F) 77 18 80/63      SpO2 Temp Source Heart Rate Source Patient Position   -- Tympanic -- Sitting      BP Location FiO2 (%)     Left arm --       Physical Exam  Constitutional:       Appearance: Normal appearance.   HENT:      Head: Normocephalic and atraumatic.   Eyes:      Extraocular Movements: Extraocular movements intact.      Pupils: Pupils are equal, round, and reactive to light.   Cardiovascular:      Rate and Rhythm: Normal rate and regular rhythm.   Pulmonary:      Effort: Pulmonary effort is normal.      Breath sounds: Normal breath sounds.   Abdominal:      General: Abdomen is flat. Bowel sounds are normal.      Palpations: Abdomen is soft.   Musculoskeletal:      Cervical back: Normal range of motion and neck supple.   Neurological:      General: No focal deficit present.      Mental Status: She is alert and oriented to person, place, and time.      Comments: NIHSS of 0.         ED Course & MDM   Diagnoses as of 05/12/24 2035   Nonintractable headache, unspecified chronicity pattern, unspecified headache type   Vertigo   Acute renal insufficiency       Medical Decision Making  Parts of this chart have been completed using voice recognition software. Please excuse any errors of transcription. Despite the medical decision making time stamp above-my medical decision making has taken place during the patient's entire visit. My thought process and reason for plan has been formulated from the time that I saw the patient until the time of disposition and is not specific to one specific moment during their visit and furthermore my MDM encompasses this entire chart and not only this text box.      HPI: Detailed above.    Exam: A medically appropriate exam performed, outlined above, given the known history and presentation.    History obtained from: Patient    Social Determinants of Health  considered during this visit: Coming from home    EKG interpreted by my attending physician, reviewed by myself.    Labs Reviewed   CBC WITH AUTO DIFFERENTIAL - Abnormal       Result Value    WBC 11.3      nRBC 0.0      RBC 4.95      Hemoglobin 14.5      Hematocrit 46.8 (*)     MCV 95      MCH 29.3      MCHC 31.0 (*)     RDW 15.9 (*)     Platelets 431      Neutrophils % 87.2      Immature Granulocytes %, Automated 0.4      Lymphocytes % 8.6      Monocytes % 2.7      Eosinophils % 0.5      Basophils % 0.6      Neutrophils Absolute 9.86 (*)     Immature Granulocytes Absolute, Automated 0.05      Lymphocytes Absolute 0.98      Monocytes Absolute 0.31      Eosinophils Absolute 0.06      Basophils Absolute 0.07     COMPREHENSIVE METABOLIC PANEL - Abnormal    Glucose 147 (*)     Sodium 142      Potassium 3.7      Chloride 101      Bicarbonate 25      Urea Nitrogen 16      Creatinine 1.80 (*)     eGFR 28 (*)     Calcium 9.4      Albumin 4.1      Alkaline Phosphatase 100      Total Protein 7.1      AST 28      Bilirubin, Total 0.4      ALT 18      Anion Gap 16     URINALYSIS WITH REFLEX CULTURE AND MICROSCOPIC - Abnormal    Color, Urine Yellow      Appearance, Urine Ex.Turbid (*)     Specific Gravity, Urine 1.031      pH, Urine 5.5      Protein, Urine 100 (2+) (*)     Glucose, Urine Normal      Blood, Urine NEGATIVE      Ketones, Urine NEGATIVE      Bilirubin, Urine NEGATIVE      Urobilinogen, Urine 2 (1+) (*)     Nitrite, Urine NEGATIVE      Leukocyte Esterase, Urine 75 Randi/µL (*)    SERIAL TROPONIN, INITIAL ([facility]) - Abnormal    Troponin T, High Sensitivity 26 (*)    PROTIME-INR - Abnormal    Protime 30.3 (*)     INR 3.1 (*)     Narrative:     INR Therapeutic Range: 2.0-3.5   APTT - Abnormal    aPTT 43.8 (*)    BLOOD GAS LACTIC ACID, VENOUS - Abnormal    POCT Lactate, Venous 2.6 (*)    MICROSCOPIC ONLY, URINE - Abnormal    WBC, Urine 11-20 (*)     RBC, Urine 1-2      Squamous Epithelial Cells, Urine 51-75 (2+)      Mucus,  Urine 4+      Hyaline Casts, Urine 3+ (*)     Calcium Oxalate Crystals, Urine 1+     BLOOD GAS LACTIC ACID, VENOUS - Normal    POCT Lactate, Venous 1.6     URINE CULTURE   URINALYSIS WITH REFLEX CULTURE AND MICROSCOPIC    Narrative:     The following orders were created for panel order Urinalysis with Reflex Culture and Microscopic.  Procedure                               Abnormality         Status                     ---------                               -----------         ------                     Urinalysis with Reflex C...[[id number]]  Abnormal            Final result               Extra Urine Gray Tube[[id number]]                            In process                   Please view results for these tests on the individual orders.   TROPONIN T SERIES, HIGH SENSITIVITY (0, 2 HR, 6 HR)    Narrative:     The following orders were created for panel order Troponin T Series, High Sensitivity (0, 2HR, 6HR).  Procedure                               Abnormality         Status                     ---------                               -----------         ------                     Serial Troponin, Initial...[[id number]]  Abnormal            Final result               Serial Troponin, 2 Hour ...[[id number]]                                                 Serial Troponin, 6 Hour ...[[id number]]                      In process                   Please view results for these tests on the individual orders.   EXTRA URINE GRAY TUBE   SERIAL TROPONIN,  2 HOUR ([facility])   SERIAL TROPONIN, 6 HOUR ([facility])     XR chest 1 view   Final Result   1.  No evidence of acute cardiopulmonary process.             Signed by: Vinay Arcos 5/12/2024 6:03 PM   Dictation workstation:   [id number]      CT head wo IV contrast   Final Result   Mild age related degenerative change as described without acute   findings or significant change from the prior exam.        Signed by: George Read 5/12/2024 4:57 PM   Dictation workstation:   [id number]         Medications   sodium chloride 0.9 % bolus 1,000 mL (0 mL intravenous Stopped 5/12/24 1810)   meclizine (Antivert) tablet 25 mg (25 mg oral Given 5/12/24 1709)   ondansetron (Zofran) injection 4 mg (4 mg intravenous Given 5/12/24 1709)   prochlorperazine (Compazine) injection 10 mg (10 mg intravenous Given 5/12/24 1709)   diphenhydrAMINE (BENADryl) injection 25 mg (25 mg intravenous Given 5/12/24 1713)   acetaminophen (Tylenol) tablet 975 mg (975 mg oral Given 5/12/24 1709)       Differential diagnoses considered for this visit include: Migraine headache versus subarachnoid hemorrhage versus vertigo    Considerations/further MDM:    Patient is an 83-year-old female presenting for evaluation of headache and dizziness. CT head without contrast shows mild age-related degenerative change without acute findings or significant change from prior examination.  CMP appreciated creatinine of 1.8, almost double from patient's baseline, pertinent for acute kidney injury.  Initial troponin of 26.  Lactate elevated at 2.6. Chest x-ray shows no evidence of acute cardiopulmonary process.  On reevaluation of the patient, she was feeling significantly better than on arrival after medication.  Given the presence of acute renal insufficiency, patient would be admitted to the hospital for further hydration and management.     The patient/family was counseled on clinical impression, expectations, and plan along with recommendations to admission. All questions were answered and involved parties were understanding and agreeable to course of treatment. Case was discussed with admitting physician and any consultants. Bed type, ED treatment and further ED workup decided by joint decision making with admitting team and any consultants. Patient stable for admission per my assessment and further management of patient will be deferred to the inpatient setting.    Patient was seen in conjunction with attending physician Dr. Rufina James    Patient's history, physical exam, diagnostic studies, and treatment plan were discussed thoroughly.    Procedure  Procedures     Katerin Cruz PA-C  05/12/24 2036

## 2024-05-13 LAB
ANION GAP SERPL CALC-SCNC: 12 MMOL/L
ATRIAL RATE: 93 BPM
BUN SERPL-MCNC: 25 MG/DL (ref 8–25)
CALCIUM SERPL-MCNC: 8.4 MG/DL (ref 8.5–10.4)
CHLORIDE SERPL-SCNC: 108 MMOL/L (ref 97–107)
CO2 SERPL-SCNC: 23 MMOL/L (ref 24–31)
CREAT SERPL-MCNC: 1.6 MG/DL (ref 0.4–1.6)
EGFRCR SERPLBLD CKD-EPI 2021: 32 ML/MIN/1.73M*2
ERYTHROCYTE [DISTWIDTH] IN BLOOD BY AUTOMATED COUNT: 15.9 % (ref 11.5–14.5)
GLUCOSE SERPL-MCNC: 91 MG/DL (ref 65–99)
HCT VFR BLD AUTO: 41.3 % (ref 36–46)
HGB BLD-MCNC: 12.6 G/DL (ref 12–16)
HOLD SPECIMEN: NORMAL
INR PPP: 2.7 (ref 0.9–1.2)
MCH RBC QN AUTO: 29.4 PG (ref 26–34)
MCHC RBC AUTO-ENTMCNC: 30.5 G/DL (ref 32–36)
MCV RBC AUTO: 96 FL (ref 80–100)
NRBC BLD-RTO: 0 /100 WBCS (ref 0–0)
P AXIS: 48 DEGREES
P OFFSET: 176 MS
P ONSET: 132 MS
PLATELET # BLD AUTO: 312 X10*3/UL (ref 150–450)
POTASSIUM SERPL-SCNC: 4.2 MMOL/L (ref 3.4–5.1)
PR INTERVAL: 182 MS
PROTHROMBIN TIME: 27.1 SECONDS (ref 9.3–12.7)
Q ONSET: 223 MS
QRS COUNT: 15 BEATS
QRS DURATION: 84 MS
QT INTERVAL: 422 MS
QTC CALCULATION(BAZETT): 524 MS
QTC FREDERICIA: 488 MS
R AXIS: 10 DEGREES
RBC # BLD AUTO: 4.29 X10*6/UL (ref 4–5.2)
SODIUM SERPL-SCNC: 143 MMOL/L (ref 133–145)
T AXIS: 45 DEGREES
T OFFSET: 434 MS
VENTRICULAR RATE: 93 BPM
WBC # BLD AUTO: 10.6 X10*3/UL (ref 4.4–11.3)

## 2024-05-13 PROCEDURE — 36415 COLL VENOUS BLD VENIPUNCTURE: CPT | Performed by: INTERNAL MEDICINE

## 2024-05-13 PROCEDURE — 80048 BASIC METABOLIC PNL TOTAL CA: CPT | Performed by: INTERNAL MEDICINE

## 2024-05-13 PROCEDURE — G0378 HOSPITAL OBSERVATION PER HR: HCPCS

## 2024-05-13 PROCEDURE — 85610 PROTHROMBIN TIME: CPT | Performed by: INTERNAL MEDICINE

## 2024-05-13 PROCEDURE — 2500000006 HC RX 250 W HCPCS SELF ADMINISTERED DRUGS (ALT 637 FOR ALL PAYERS): Performed by: INTERNAL MEDICINE

## 2024-05-13 PROCEDURE — 2500000004 HC RX 250 GENERAL PHARMACY W/ HCPCS (ALT 636 FOR OP/ED): Performed by: INTERNAL MEDICINE

## 2024-05-13 PROCEDURE — 85027 COMPLETE CBC AUTOMATED: CPT | Performed by: INTERNAL MEDICINE

## 2024-05-13 PROCEDURE — 97165 OT EVAL LOW COMPLEX 30 MIN: CPT | Mod: GO

## 2024-05-13 PROCEDURE — 97530 THERAPEUTIC ACTIVITIES: CPT | Mod: GO

## 2024-05-13 PROCEDURE — 94640 AIRWAY INHALATION TREATMENT: CPT

## 2024-05-13 PROCEDURE — 2500000001 HC RX 250 WO HCPCS SELF ADMINISTERED DRUGS (ALT 637 FOR MEDICARE OP): Performed by: INTERNAL MEDICINE

## 2024-05-13 RX ORDER — WARFARIN 3 MG/1
TABLET ORAL ONCE
Status: CANCELLED | OUTPATIENT
Start: 2024-05-13

## 2024-05-13 RX ORDER — BISMUTH SUBSALICYLATE 262 MG
1 TABLET,CHEWABLE ORAL DAILY
COMMUNITY
End: 2024-05-14 | Stop reason: HOSPADM

## 2024-05-13 RX ORDER — GABAPENTIN 300 MG/1
300 CAPSULE ORAL 2 TIMES DAILY
Status: DISCONTINUED | OUTPATIENT
Start: 2024-05-14 | End: 2024-05-14 | Stop reason: HOSPADM

## 2024-05-13 RX ORDER — VENLAFAXINE HYDROCHLORIDE 37.5 MG/1
37.5 CAPSULE, EXTENDED RELEASE ORAL
Status: CANCELLED | OUTPATIENT
Start: 2024-05-14

## 2024-05-13 RX ORDER — VENLAFAXINE HYDROCHLORIDE 37.5 MG/1
37.5 CAPSULE, EXTENDED RELEASE ORAL DAILY
COMMUNITY
End: 2024-05-14 | Stop reason: HOSPADM

## 2024-05-13 RX ADMIN — TIOTROPIUM BROMIDE INHALATION SPRAY 2 PUFF: 3.12 SPRAY, METERED RESPIRATORY (INHALATION) at 07:54

## 2024-05-13 RX ADMIN — WARFARIN SODIUM 3 MG: 3 TABLET ORAL at 17:44

## 2024-05-13 RX ADMIN — SODIUM CHLORIDE 100 ML/HR: 900 INJECTION, SOLUTION INTRAVENOUS at 08:13

## 2024-05-13 RX ADMIN — METOPROLOL TARTRATE 12.5 MG: 25 TABLET, FILM COATED ORAL at 20:21

## 2024-05-13 RX ADMIN — FLUTICASONE FUROATE AND VILANTEROL TRIFENATATE 1 PUFF: 200; 25 POWDER RESPIRATORY (INHALATION) at 07:54

## 2024-05-13 RX ADMIN — ACETAMINOPHEN 650 MG: 325 TABLET ORAL at 20:28

## 2024-05-13 RX ADMIN — NORTRIPTYLINE HYDROCHLORIDE 25 MG: 25 CAPSULE ORAL at 08:41

## 2024-05-13 RX ADMIN — TRIFLUOPERAZINE HYDROCHLORIDE 5 MG: 5 TABLET, FILM COATED ORAL at 20:22

## 2024-05-13 RX ADMIN — GABAPENTIN 200 MG: 100 CAPSULE ORAL at 08:08

## 2024-05-13 RX ADMIN — GABAPENTIN 200 MG: 100 CAPSULE ORAL at 14:23

## 2024-05-13 RX ADMIN — METOPROLOL TARTRATE 12.5 MG: 25 TABLET, FILM COATED ORAL at 08:08

## 2024-05-13 RX ADMIN — CEFTRIAXONE SODIUM 1 G: 1 INJECTION, SOLUTION INTRAVENOUS at 20:22

## 2024-05-13 RX ADMIN — SODIUM CHLORIDE 100 ML/HR: 900 INJECTION, SOLUTION INTRAVENOUS at 20:38

## 2024-05-13 SDOH — SOCIAL STABILITY: SOCIAL INSECURITY: DO YOU FEEL ANYONE HAS EXPLOITED OR TAKEN ADVANTAGE OF YOU FINANCIALLY OR OF YOUR PERSONAL PROPERTY?: NO

## 2024-05-13 SDOH — ECONOMIC STABILITY: FOOD INSECURITY: WITHIN THE PAST 12 MONTHS, THE FOOD YOU BOUGHT JUST DIDN'T LAST AND YOU DIDN'T HAVE MONEY TO GET MORE.: NEVER TRUE

## 2024-05-13 SDOH — ECONOMIC STABILITY: HOUSING INSECURITY
IN THE LAST 12 MONTHS, WAS THERE A TIME WHEN YOU DID NOT HAVE A STEADY PLACE TO SLEEP OR SLEPT IN A SHELTER (INCLUDING NOW)?: NO

## 2024-05-13 SDOH — SOCIAL STABILITY: SOCIAL INSECURITY: ABUSE: ADULT

## 2024-05-13 SDOH — HEALTH STABILITY: MENTAL HEALTH: HOW OFTEN DO YOU HAVE 6 OR MORE DRINKS ON ONE OCCASION?: NEVER

## 2024-05-13 SDOH — SOCIAL STABILITY: SOCIAL INSECURITY: DO YOU FEEL UNSAFE GOING BACK TO THE PLACE WHERE YOU ARE LIVING?: NO

## 2024-05-13 SDOH — ECONOMIC STABILITY: INCOME INSECURITY: HOW HARD IS IT FOR YOU TO PAY FOR THE VERY BASICS LIKE FOOD, HOUSING, MEDICAL CARE, AND HEATING?: NOT HARD AT ALL

## 2024-05-13 SDOH — SOCIAL STABILITY: SOCIAL INSECURITY: WITHIN THE LAST YEAR, HAVE YOU BEEN HUMILIATED OR EMOTIONALLY ABUSED IN OTHER WAYS BY YOUR PARTNER OR EX-PARTNER?: NO

## 2024-05-13 SDOH — SOCIAL STABILITY: SOCIAL NETWORK: HOW OFTEN DO YOU ATTENT MEETINGS OF THE CLUB OR ORGANIZATION YOU BELONG TO?: NEVER

## 2024-05-13 SDOH — HEALTH STABILITY: MENTAL HEALTH: HOW OFTEN DO YOU HAVE A DRINK CONTAINING ALCOHOL?: NEVER

## 2024-05-13 SDOH — SOCIAL STABILITY: SOCIAL INSECURITY: WITHIN THE LAST YEAR, HAVE YOU BEEN AFRAID OF YOUR PARTNER OR EX-PARTNER?: NO

## 2024-05-13 SDOH — SOCIAL STABILITY: SOCIAL INSECURITY: DOES ANYONE TRY TO KEEP YOU FROM HAVING/CONTACTING OTHER FRIENDS OR DOING THINGS OUTSIDE YOUR HOME?: NO

## 2024-05-13 SDOH — SOCIAL STABILITY: SOCIAL INSECURITY: WERE YOU ABLE TO COMPLETE ALL THE BEHAVIORAL HEALTH SCREENINGS?: YES

## 2024-05-13 SDOH — SOCIAL STABILITY: SOCIAL NETWORK: ARE YOU MARRIED, WIDOWED, DIVORCED, SEPARATED, NEVER MARRIED, OR LIVING WITH A PARTNER?: MARRIED

## 2024-05-13 SDOH — ECONOMIC STABILITY: FOOD INSECURITY: WITHIN THE PAST 12 MONTHS, YOU WORRIED THAT YOUR FOOD WOULD RUN OUT BEFORE YOU GOT MONEY TO BUY MORE.: NEVER TRUE

## 2024-05-13 SDOH — SOCIAL STABILITY: SOCIAL INSECURITY
WITHIN THE LAST YEAR, HAVE TO BEEN RAPED OR FORCED TO HAVE ANY KIND OF SEXUAL ACTIVITY BY YOUR PARTNER OR EX-PARTNER?: NO

## 2024-05-13 SDOH — SOCIAL STABILITY: SOCIAL INSECURITY: ARE THERE ANY APPARENT SIGNS OF INJURIES/BEHAVIORS THAT COULD BE RELATED TO ABUSE/NEGLECT?: NO

## 2024-05-13 SDOH — SOCIAL STABILITY: SOCIAL NETWORK: HOW OFTEN DO YOU ATTEND CHURCH OR RELIGIOUS SERVICES?: MORE THAN 4 TIMES PER YEAR

## 2024-05-13 SDOH — SOCIAL STABILITY: SOCIAL INSECURITY: ARE YOU OR HAVE YOU BEEN THREATENED OR ABUSED PHYSICALLY, EMOTIONALLY, OR SEXUALLY BY ANYONE?: NO

## 2024-05-13 SDOH — HEALTH STABILITY: MENTAL HEALTH
STRESS IS WHEN SOMEONE FEELS TENSE, NERVOUS, ANXIOUS, OR CAN'T SLEEP AT NIGHT BECAUSE THEIR MIND IS TROUBLED. HOW STRESSED ARE YOU?: NOT AT ALL

## 2024-05-13 SDOH — SOCIAL STABILITY: SOCIAL INSECURITY
WITHIN THE LAST YEAR, HAVE YOU BEEN KICKED, HIT, SLAPPED, OR OTHERWISE PHYSICALLY HURT BY YOUR PARTNER OR EX-PARTNER?: NO

## 2024-05-13 SDOH — ECONOMIC STABILITY: INCOME INSECURITY: IN THE PAST 12 MONTHS, HAS THE ELECTRIC, GAS, OIL, OR WATER COMPANY THREATENED TO SHUT OFF SERVICE IN YOUR HOME?: NO

## 2024-05-13 SDOH — HEALTH STABILITY: PHYSICAL HEALTH: ON AVERAGE, HOW MANY MINUTES DO YOU ENGAGE IN EXERCISE AT THIS LEVEL?: 0 MIN

## 2024-05-13 SDOH — ECONOMIC STABILITY: INCOME INSECURITY: IN THE LAST 12 MONTHS, WAS THERE A TIME WHEN YOU WERE NOT ABLE TO PAY THE MORTGAGE OR RENT ON TIME?: NO

## 2024-05-13 SDOH — SOCIAL STABILITY: SOCIAL NETWORK
DO YOU BELONG TO ANY CLUBS OR ORGANIZATIONS SUCH AS CHURCH GROUPS UNIONS, FRATERNAL OR ATHLETIC GROUPS, OR SCHOOL GROUPS?: NO

## 2024-05-13 SDOH — HEALTH STABILITY: PHYSICAL HEALTH: ON AVERAGE, HOW MANY DAYS PER WEEK DO YOU ENGAGE IN MODERATE TO STRENUOUS EXERCISE (LIKE A BRISK WALK)?: 0 DAYS

## 2024-05-13 SDOH — ECONOMIC STABILITY: TRANSPORTATION INSECURITY
IN THE PAST 12 MONTHS, HAS THE LACK OF TRANSPORTATION KEPT YOU FROM MEDICAL APPOINTMENTS OR FROM GETTING MEDICATIONS?: NO

## 2024-05-13 SDOH — SOCIAL STABILITY: SOCIAL NETWORK: HOW OFTEN DO YOU GET TOGETHER WITH FRIENDS OR RELATIVES?: MORE THAN THREE TIMES A WEEK

## 2024-05-13 SDOH — ECONOMIC STABILITY: TRANSPORTATION INSECURITY
IN THE PAST 12 MONTHS, HAS LACK OF TRANSPORTATION KEPT YOU FROM MEETINGS, WORK, OR FROM GETTING THINGS NEEDED FOR DAILY LIVING?: NO

## 2024-05-13 SDOH — SOCIAL STABILITY: SOCIAL INSECURITY: HAVE YOU HAD ANY THOUGHTS OF HARMING ANYONE ELSE?: NO

## 2024-05-13 SDOH — HEALTH STABILITY: MENTAL HEALTH
HOW OFTEN DO YOU NEED TO HAVE SOMEONE HELP YOU WHEN YOU READ INSTRUCTIONS, PAMPHLETS, OR OTHER WRITTEN MATERIAL FROM YOUR DOCTOR OR PHARMACY?: SOMETIMES

## 2024-05-13 SDOH — SOCIAL STABILITY: SOCIAL INSECURITY: HAS ANYONE EVER THREATENED TO HURT YOUR FAMILY OR YOUR PETS?: NO

## 2024-05-13 SDOH — HEALTH STABILITY: MENTAL HEALTH: HOW MANY STANDARD DRINKS CONTAINING ALCOHOL DO YOU HAVE ON A TYPICAL DAY?: PATIENT DOES NOT DRINK

## 2024-05-13 SDOH — SOCIAL STABILITY: SOCIAL INSECURITY: HAVE YOU HAD THOUGHTS OF HARMING ANYONE ELSE?: NO

## 2024-05-13 SDOH — ECONOMIC STABILITY: HOUSING INSECURITY: IN THE LAST 12 MONTHS, HOW MANY PLACES HAVE YOU LIVED?: 1

## 2024-05-13 SDOH — SOCIAL STABILITY: SOCIAL NETWORK
IN A TYPICAL WEEK, HOW MANY TIMES DO YOU TALK ON THE PHONE WITH FAMILY, FRIENDS, OR NEIGHBORS?: MORE THAN THREE TIMES A WEEK

## 2024-05-13 ASSESSMENT — LIFESTYLE VARIABLES
HOW MANY STANDARD DRINKS CONTAINING ALCOHOL DO YOU HAVE ON A TYPICAL DAY: PATIENT DOES NOT DRINK
AUDIT-C TOTAL SCORE: 0
EVER HAD A DRINK FIRST THING IN THE MORNING TO STEADY YOUR NERVES TO GET RID OF A HANGOVER: NO
PRESCIPTION_ABUSE_PAST_12_MONTHS: NO
HAVE YOU EVER FELT YOU SHOULD CUT DOWN ON YOUR DRINKING: NO
HOW MANY STANDARD DRINKS CONTAINING ALCOHOL DO YOU HAVE ON A TYPICAL DAY: PATIENT DOES NOT DRINK
HOW OFTEN DO YOU HAVE 6 OR MORE DRINKS ON ONE OCCASION: NEVER
HOW OFTEN DO YOU HAVE A DRINK CONTAINING ALCOHOL: NEVER
EVER FELT BAD OR GUILTY ABOUT YOUR DRINKING: NO
SKIP TO QUESTIONS 9-10: 1
SKIP TO QUESTIONS 9-10: 1
HOW OFTEN DO YOU HAVE A DRINK CONTAINING ALCOHOL: NEVER
TOTAL SCORE: 0
SKIP TO QUESTIONS 9-10: 1
HOW OFTEN DO YOU HAVE 6 OR MORE DRINKS ON ONE OCCASION: NEVER
AUDIT-C TOTAL SCORE: 0
PRESCIPTION_ABUSE_PAST_12_MONTHS: NO
AUDIT-C TOTAL SCORE: 0
HAVE PEOPLE ANNOYED YOU BY CRITICIZING YOUR DRINKING: NO
SUBSTANCE_ABUSE_PAST_12_MONTHS: NO
SUBSTANCE_ABUSE_PAST_12_MONTHS: NO

## 2024-05-13 ASSESSMENT — PAIN SCALES - GENERAL
PAINLEVEL_OUTOF10: 0 - NO PAIN
PAINLEVEL_OUTOF10: 0 - NO PAIN
PAINLEVEL_OUTOF10: 3
PAINLEVEL_OUTOF10: 0 - NO PAIN
PAINLEVEL_OUTOF10: 0 - NO PAIN

## 2024-05-13 ASSESSMENT — COGNITIVE AND FUNCTIONAL STATUS - GENERAL
DAILY ACTIVITIY SCORE: 24
DRESSING REGULAR UPPER BODY CLOTHING: A LITTLE
HELP NEEDED FOR BATHING: A LITTLE
MOBILITY SCORE: 20
PATIENT BASELINE BEDBOUND: NO
CLIMB 3 TO 5 STEPS WITH RAILING: A LOT
STANDING UP FROM CHAIR USING ARMS: A LITTLE
MOVING FROM LYING ON BACK TO SITTING ON SIDE OF FLAT BED WITH BEDRAILS: A LITTLE
DAILY ACTIVITIY SCORE: 23
MOBILITY SCORE: 23
PERSONAL GROOMING: A LITTLE
TURNING FROM BACK TO SIDE WHILE IN FLAT BAD: A LITTLE
DAILY ACTIVITIY SCORE: 18
DRESSING REGULAR LOWER BODY CLOTHING: A LITTLE
WALKING IN HOSPITAL ROOM: A LITTLE
STANDING UP FROM CHAIR USING ARMS: A LITTLE
CLIMB 3 TO 5 STEPS WITH RAILING: A LITTLE
MOVING TO AND FROM BED TO CHAIR: A LITTLE
DAILY ACTIVITIY SCORE: 24
MOBILITY SCORE: 17
MOVING TO AND FROM BED TO CHAIR: A LITTLE
MOBILITY SCORE: 23
EATING MEALS: A LITTLE
CLIMB 3 TO 5 STEPS WITH RAILING: A LITTLE
CLIMB 3 TO 5 STEPS WITH RAILING: A LITTLE
PERSONAL GROOMING: A LITTLE
WALKING IN HOSPITAL ROOM: A LITTLE
TOILETING: A LITTLE

## 2024-05-13 ASSESSMENT — PATIENT HEALTH QUESTIONNAIRE - PHQ9
2. FEELING DOWN, DEPRESSED OR HOPELESS: NOT AT ALL
SUM OF ALL RESPONSES TO PHQ9 QUESTIONS 1 & 2: 0
2. FEELING DOWN, DEPRESSED OR HOPELESS: NOT AT ALL
1. LITTLE INTEREST OR PLEASURE IN DOING THINGS: NOT AT ALL

## 2024-05-13 ASSESSMENT — ACTIVITIES OF DAILY LIVING (ADL)
WALKS IN HOME: INDEPENDENT
ADEQUATE_TO_COMPLETE_ADL: YES
PATIENT'S MEMORY ADEQUATE TO SAFELY COMPLETE DAILY ACTIVITIES?: YES
GROOMING: INDEPENDENT
ADL_ASSISTANCE: INDEPENDENT
JUDGMENT_ADEQUATE_SAFELY_COMPLETE_DAILY_ACTIVITIES: YES
BATHING: NEEDS ASSISTANCE
ADL_ASSISTANCE: INDEPENDENT
TOILETING: INDEPENDENT
FEEDING YOURSELF: INDEPENDENT
HEARING - RIGHT EAR: HEARING AID
BATHING_ASSISTANCE: NOT PERFORMED
DRESSING YOURSELF: INDEPENDENT
ASSISTIVE_DEVICE: WALKER
HEARING - LEFT EAR: HEARING AID

## 2024-05-13 ASSESSMENT — PAIN DESCRIPTION - LOCATION: LOCATION: BACK

## 2024-05-13 ASSESSMENT — PAIN - FUNCTIONAL ASSESSMENT
PAIN_FUNCTIONAL_ASSESSMENT: 0-10

## 2024-05-13 ASSESSMENT — PAIN DESCRIPTION - DESCRIPTORS: DESCRIPTORS: ACHING;SORE

## 2024-05-13 NOTE — ED NOTES
PT reports patient dropped to 89-90% on room air, 2L NC re applied.      Erin Geiger RN  05/13/24 [id number]

## 2024-05-13 NOTE — PROGRESS NOTES
"Desiree Green is a 83 y.o. female on day 0 of admission presenting with CARMEL (acute kidney injury) (CMS-[facility]).    Subjective   HPI   Patient seen and examined, was sitting in her chair, complains of back pain.  Denies any headache.  General fatigue.  HEENT is on 2 L nasal cannula does not wear oxygen at home.  Patient denies any chest pain, shortness of breath in room air.  Patient tolerates well her diet with no nausea vomiting or abdominal pain.  No fever or chills.      Objective     Last Recorded Vitals  Blood pressure 103/59, pulse 80, temperature 36.2 °C (97.1 °F), temperature source Tympanic, resp. rate 18, height 1.6 m (5' 3\"), weight 89.8 kg (198 lb), SpO2 94%.    No intake or output data in the 24 hours ending 05/13/24 1534      Physical Exam  Vitals and nursing note reviewed.   Constitutional:       Appearance: She is obese.   HENT:      Head: Normocephalic and atraumatic.      Nose: Nose normal. No congestion or rhinorrhea.      Mouth/Throat:      Mouth: Mucous membranes are moist.   Eyes:      Extraocular Movements: Extraocular movements intact.      Pupils: Pupils are equal, round, and reactive to light.   Cardiovascular:      Rate and Rhythm: Normal rate.      Pulses: Normal pulses.   Pulmonary:      Effort: Pulmonary effort is normal. No respiratory distress.      Breath sounds: Normal breath sounds. No wheezing, rhonchi or rales.   Chest:      Chest wall: No tenderness.   Abdominal:      General: Bowel sounds are normal. There is no distension.      Palpations: Abdomen is soft.   Musculoskeletal:         General: No swelling. Normal range of motion.      Cervical back: Normal range of motion and neck supple. No tenderness.      Right lower leg: No edema.      Left lower leg: No edema.   Lymphadenopathy:      Cervical: No cervical adenopathy.   Skin:     General: Skin is warm.   Neurological:      General: No focal deficit present.      Mental Status: She is alert. Mental status is at baseline. "   Psychiatric:         Mood and Affect: Mood normal.         Behavior: Behavior normal.          Relevant Results    Lab Results   Component Value Date    WBC 10.6 05/13/2024    HGB 12.6 05/13/2024    HCT 41.3 05/13/2024    MCV 96 05/13/2024     05/13/2024       Lab Results   Component Value Date    GLUCOSE 91 05/13/2024    CALCIUM 8.4 (L) 05/13/2024     05/13/2024    K 4.2 05/13/2024    CO2 23 (L) 05/13/2024     (H) 05/13/2024    BUN 25 05/13/2024    CREATININE 1.60 05/13/2024       Lab Results   Component Value Date    HGBA1C 5.5 04/21/2022       ECG 12 lead    Result Date: 5/13/2024   Poor data quality, interpretation may be adversely affected Normal sinus rhythm Prolonged QT Abnormal ECG No previous ECGs available Confirmed by Jose Bender ([id number]) on 5/13/2024 10:10:45 AM    XR chest 1 view    Result Date: 5/12/2024  Interpreted By:  Vinay Arcos, STUDY: XR CHEST 1 VIEW;  5/12/2024 5:32 pm   INDICATION: Signs/Symptoms:malaise.   COMPARISON: Chest radiograph 05/04/2022.   ACCESSION NUMBER(S): [id number]   ORDERING CLINICIAN: EMMANUEL ROSALES   FINDINGS:   CARDIOMEDIASTINAL SILHOUETTE: Cardiomediastinal silhouette is normal in size and configuration.   LUNGS/PLEURA: There are no consolidations.There are no pleural effusions. There is elevation of the right hemidiaphragm similar to prior. There is no demonstrated pneumothorax.       BONES: No evidence of acute osseous abnormality.       1.  No evidence of acute cardiopulmonary process.     Signed by: Vinay Arcos 5/12/2024 6:03 PM Dictation workstation:   [id number]    CT head wo IV contrast    Result Date: 5/12/2024  Interpreted By:  George Read, STUDY: CT HEAD WO IV CONTRAST;  5/12/2024 4:46 pm   INDICATION: Signs/Symptoms:headache, dizziness.   COMPARISON: 03/26/2022   ACCESSION NUMBER(S): [id number]   ORDERING CLINICIAN: AJ HODGES   TECHNIQUE: Sequential trans axial images were obtained  .   FINDINGS: INTRACRANIAL:   There is mild  prominence of the cortical sulci indicating atrophy.   There is mild ventriculomegaly, again consistent with atrophy.   Mild decreased attenuation of the periventricular and long tracks of the white matter most consistent with gliosis from arterial disease. There is no evidence of definite subacute infarction, intracranial hemorrhage or mass.     EXTRACRANIAL: Visualized paranasal sinuses and mastoids are clear. The calvarium is intact.       Mild age related degenerative change as described without acute findings or significant change from the prior exam.   Signed by: George Read 5/12/2024 4:57 PM Dictation workstation:   [id number]    Scheduled medications  cefTRIAXone, 1 g, intravenous, q24h ROBERT  fluticasone furoate-vilanteroL, 1 puff, inhalation, Daily  [START ON 5/14/2024] gabapentin, 300 mg, oral, BID  metoprolol tartrate, 12.5 mg, oral, BID  nortriptyline, 25 mg, oral, Daily  tiotropium, 2 puff, inhalation, Daily  trifluoperazine, 5 mg, oral, Nightly  warfarin, 3 mg, oral, Once per day on Monday Wednesday Friday      Continuous medications  sodium chloride 0.9%, 100 mL/hr, Last Rate: 100 mL/hr (05/13/24 1401)      PRN medications  PRN medications: acetaminophen **OR** acetaminophen **OR** acetaminophen, benzocaine-menthol, guaiFENesin, ipratropium-albuteroL, meclizine, melatonin, ondansetron ODT **OR** ondansetron    Assessment/Plan   Acute kidney injury  Creatinine 1.8 was  1.8 pt's baseline 1.0  On  normal saline 100 mL/h.  RFP in the morning.    Improved      Headache  No headache today   improved with IV fluids and Tylenol   CT normal.  Continue Tylenol as needed.     Urinary tract infection  On  ceftriaxone for now and follow urine culture.     Schizophrenia  Stable.  continue with  home medication     Weakness/deconditioning   Fall precautions  PT/OT    Update home meds  Discharge plan:  Anticipate discharging patient home with home health care vs skilled of nursing when medically clear    I spent 35  minutes in the professional and overall care of this patient.    Nelida Cesar, APRN-CNP

## 2024-05-13 NOTE — PROGRESS NOTES
Pharmacy Medication History Review    Desiree Green is a 83 y.o. female admitted for CARMEL (acute kidney injury) (CMS-Formerly [facility]). Pharmacy reviewed the patient's [id number] medications and allergies for accuracy.    Medications ADDED:  Venlafaxine ER 37.5mg  Multivitamin  Medications CHANGED:  Gabapentin 300mg - 1 BID   Trifluoperazine (Stelazine) 1 tablet M/W/F/Sat and 2 tablet T/TR/Sun  Metoprolol tartrate 25mg - Pt reports not taking  Medications REMOVED:   Trifluoperazine - duplicate  Spiriva  prednisone     The list below reflects the updated PTA list. Comments regarding how patient may be taking medications differently can be found in the Admit Orders Activity  Prior to Admission Medications   Prescriptions Last Dose Informant   Breo Ellipta 200-25 mcg/dose inhaler  Self   Sig: Inhale 1 puff once daily.   acetaminophen (Tylenol 8 Hour) 650 mg ER tablet  Self   Sig: Take 2 tablets (1,300 mg) by mouth every 12 hours.   acetaminophen (Tylenol) 325 mg tablet  Self   Sig: Take 2 tablets (650 mg) by mouth every 6 hours if needed.   albuterol 90 mcg/actuation inhaler  Self   Sig: Inhale 1 puff every 4 hours if needed.   gabapentin (Neurontin) 300 mg capsule  Self   Sig: Take 1 capsule (300 mg) by mouth 2 times a day.   meclizine (Antivert) 25 mg tablet  Self   Sig: Take 1 tablet (25 mg) by mouth once daily as needed for dizziness.   metoprolol tartrate (Lopressor) 25 mg tablet  Self   Sig: Take 0.5 tablets (12.5 mg) by mouth 2 times a day.   multivitamin tablet  Self   Sig: Take 1 tablet by mouth once daily.   nortriptyline (Pamelor) 25 mg capsule  Self   Sig: Take 1 capsule (25 mg) by mouth once daily at bedtime.   trifluoperazine (Stelazine) 5 mg tablet  Self   Sig: once every 24 hours. TAKE 1 TABLET M/W/F/SAT AND 2 TABLETS T/TR/SUN   venlafaxine XR (Effexor-XR) 37.5 mg 24 hr capsule  Self   Sig: Take 1 capsule (37.5 mg) by mouth once daily. Do not crush or chew.   warfarin (Coumadin) 3 mg tablet  Self   Sig:  TAKE 1 TABLET BY MOUTH 4 TIMES  WEEKLY TUESDAY FRIDAY SATURDAY SUNDAY   warfarin (Coumadin) 6 mg tablet  Self   Sig: TAKE 1 TABLET BY MOUTH 3 TIMES  WEEKLY ON MONDAY, WEDNESDAY,  THURSDAY      Facility-Administered Medications: None        The list below reflects the updated allergy list. Please review each documented allergy for additional clarification and justification.  Allergies  Reviewed by Keysha Ontiveros on 5/13/2024   No Known Allergies         Pharmacy has been updated to [facility].    Sources used to complete the med history include PTA medication list, historical AVS, dispense history, patient/spouse interview. Informants are good historians.    Below are additional concerns with the patient's PTA list.  none    Keysha Ontiveros  Please reach out via [employer] Secure Chat for questions

## 2024-05-13 NOTE — H&P
History Of Present Illness  Desiree Green is a 83 y.o. female presenting with headache and dizziness.  Patient has underlying vertigo today.  She had a severe headache starting at the back of her skull spreading into the forehead; she typically does not get migraines.  She went to the bathroom because of this reason, at this point she said that she was getting some blurry vision in bilateral eyes, now resolved.  No facial droop.  No dysarthria.  No unilateral weakness.  Family tells me that she has not been eating and drinking as much, and they have to encourage by mouth intake.  Normally ambulates with a cane.  In the emergency department, she was given a liter normal saline, Zofran, Tylenol with improvement in symptoms.  She generally feels better since coming in.     Past Medical History  She has a past medical history of Other specified anxiety disorders, Personal history of diseases of the blood and blood-forming organs and certain disorders involving the immune mechanism, and Personal history of other endocrine, nutritional and metabolic disease.    Surgical History  She has a past surgical history that includes Other surgical history (08/23/2021); Other surgical history (08/23/2021); and Other surgical history (08/23/2021).     Social History  She has no history on file for tobacco use, alcohol use, and drug use.    Family History  Family History   Problem Relation Name Age of Onset    Heart attack Mother      Heart disease Mother      Heart attack Father      Heart disease Father          Allergies  Patient has no known allergies.    Review of Systems   All other systems reviewed and are negative.       Physical Exam  Constitutional:       General: She is not in acute distress.     Appearance: She is not toxic-appearing.   HENT:      Head: Normocephalic.      Mouth/Throat:      Mouth: Mucous membranes are dry.   Eyes:      Conjunctiva/sclera: Conjunctivae normal.   Cardiovascular:      Rate and Rhythm:  Normal rate.   Pulmonary:      Breath sounds: Normal breath sounds.   Skin:     General: Skin is warm.   Neurological:      General: No focal deficit present.      Mental Status: She is alert.   Psychiatric:         Behavior: Behavior normal.          Last Recorded Vitals  BP (!) 154/92   Pulse 80   Temp 36.2 °C (97.2 °F) (Tympanic)   Resp 16   Wt 89.8 kg (198 lb)   SpO2 96%     Relevant Results        Results for orders placed or performed during the hospital encounter of 05/12/24 (from the past 24 hour(s))   CBC and Auto Differential   Result Value Ref Range    WBC 11.3 4.4 - 11.3 x10*3/uL    nRBC 0.0 0.0 - 0.0 /100 WBCs    RBC 4.95 4.00 - 5.20 x10*6/uL    Hemoglobin 14.5 12.0 - 16.0 g/dL    Hematocrit 46.8 (H) 36.0 - 46.0 %    MCV 95 80 - 100 fL    MCH 29.3 26.0 - 34.0 pg    MCHC 31.0 (L) 32.0 - 36.0 g/dL    RDW 15.9 (H) 11.5 - 14.5 %    Platelets 431 150 - 450 x10*3/uL    Neutrophils % 87.2 40.0 - 80.0 %    Immature Granulocytes %, Automated 0.4 0.0 - 0.9 %    Lymphocytes % 8.6 13.0 - 44.0 %    Monocytes % 2.7 2.0 - 10.0 %    Eosinophils % 0.5 0.0 - 6.0 %    Basophils % 0.6 0.0 - 2.0 %    Neutrophils Absolute 9.86 (H) 1.60 - 5.50 x10*3/uL    Immature Granulocytes Absolute, Automated 0.05 0.00 - 0.50 x10*3/uL    Lymphocytes Absolute 0.98 0.80 - 3.00 x10*3/uL    Monocytes Absolute 0.31 0.05 - 0.80 x10*3/uL    Eosinophils Absolute 0.06 0.00 - 0.40 x10*3/uL    Basophils Absolute 0.07 0.00 - 0.10 x10*3/uL   Comprehensive metabolic panel   Result Value Ref Range    Glucose 147 (H) 65 - 99 mg/dL    Sodium 142 133 - 145 mmol/L    Potassium 3.7 3.4 - 5.1 mmol/L    Chloride 101 97 - 107 mmol/L    Bicarbonate 25 24 - 31 mmol/L    Urea Nitrogen 16 8 - 25 mg/dL    Creatinine 1.80 (H) 0.40 - 1.60 mg/dL    eGFR 28 (L) >60 mL/min/1.73m*2    Calcium 9.4 8.5 - 10.4 mg/dL    Albumin 4.1 3.5 - 5.0 g/dL    Alkaline Phosphatase 100 35 - 125 U/L    Total Protein 7.1 5.9 - 7.9 g/dL    AST 28 5 - 40 U/L    Bilirubin, Total 0.4 0.1  - 1.2 mg/dL    ALT 18 5 - 40 U/L    Anion Gap 16 <=19 mmol/L   Serial Troponin, Initial ([facility])   Result Value Ref Range    Troponin T, High Sensitivity 26 (HH) <=14 ng/L   Urinalysis with Reflex Culture and Microscopic   Result Value Ref Range    Color, Urine Yellow Light-Yellow, Yellow, Dark-Yellow    Appearance, Urine Ex.Turbid (N) Clear    Specific Gravity, Urine 1.031 1.005 - 1.035    pH, Urine 5.5 5.0, 5.5, 6.0, 6.5, 7.0, 7.5, 8.0    Protein, Urine 100 (2+) (A) NEGATIVE, 10 (TRACE), 20 (TRACE) mg/dL    Glucose, Urine Normal Normal mg/dL    Blood, Urine NEGATIVE NEGATIVE    Ketones, Urine NEGATIVE NEGATIVE mg/dL    Bilirubin, Urine NEGATIVE NEGATIVE    Urobilinogen, Urine 2 (1+) (A) Normal mg/dL    Nitrite, Urine NEGATIVE NEGATIVE    Leukocyte Esterase, Urine 75 Randi/µL (A) NEGATIVE   Protime-INR   Result Value Ref Range    Protime 30.3 (H) 9.3 - 12.7 seconds    INR 3.1 (H) 0.9 - 1.2   APTT   Result Value Ref Range    aPTT 43.8 (H) 22.0 - 32.5 seconds   Microscopic Only, Urine   Result Value Ref Range    WBC, Urine 11-20 (A) 1-5, NONE /HPF    RBC, Urine 1-2 NONE, 1-2, 3-5 /HPF    Squamous Epithelial Cells, Urine 51-75 (2+) Reference range not established. /HPF    Mucus, Urine 4+ Reference range not established. /LPF    Hyaline Casts, Urine 3+ (A) NONE /LPF    Calcium Oxalate Crystals, Urine 1+ NONE, 1+ /HPF   BLOOD GAS LACTIC ACID, VENOUS   Result Value Ref Range    POCT Lactate, Venous 2.6 (H) 0.4 - 2.0 mmol/L   Blood Gas Lactic Acid, Venous   Result Value Ref Range    POCT Lactate, Venous 1.6 0.4 - 2.0 mmol/L   Serial Troponin, 6 Hour ([facility])   Result Value Ref Range    Troponin T, High Sensitivity 19 (HH) <=14 ng/L         Assessment/Plan   Principal Problem:    CARMEL (acute kidney injury) (CMS-formerly [facility])    Acute kidney injury  -Creatinine up to 1.8 from baseline 1.0  -Suspect prerenal.  -Start normal saline 100 mL/h.  RFP in the morning.  If creatinine not improving, would consider nephrology  consult.    Headache  -Suspect this is more from dehydration already improved with IV fluids and Tylenol in the emergency department.  Vision changes also can be from migraine  -CT normal.  -Continue Tylenol as needed.  -If persistent, consider neurology consult.    Urinary tract infection  -Urinalysis is equivocal, but will cover with ceftriaxone for now and follow urine culture.    Schizophrenia  -Appears uncontrolled.  Patient can continue her home medication, which at least in the past was not in our pharmacy here.    Therapy Occupational Therapy    Discussed CODE STATUS with family.  DO NOT RESUSCITATE okay for temporary intubation.    Family is hopeful for discharge tomorrow if possible         Steven Platt,

## 2024-05-13 NOTE — PROGRESS NOTES
Desiree Green is a 83 y.o. female on day 0 of admission presenting with CARMEL (acute kidney injury) (CMS-HCC).       05/13/24 1103   Discharge Planning   Living Arrangements Spouse/significant other   Support Systems Spouse/significant other;Children;Mosque/sarah community   Assistance Needed none   Type of Residence Private residence   Number of Stairs to Enter Residence 3   Number of Stairs Within Residence 2  (one flight to basement, one flight to upper level)   Do you have animals or pets at home? No   Who is requesting discharge planning? Provider   Home or Post Acute Services None   Patient expects to be discharged to: home   Does the patient need discharge transport arranged? No   Patient Choice   Provider Choice list and CMS website (https://medicare.gov/care-compare#search) for post-acute Quality and Resource Measure Data were provided and reviewed with: Other (Comment)  (no skilled needs)   Patient / Family choosing to utilize agency / facility established prior to hospitalization No         Maria Luz Soto RN

## 2024-05-13 NOTE — PROGRESS NOTES
Desiree Green is a 83 y.o. female on day 0 of admission presenting with CARMEL (acute kidney injury) (CMS-[facility]).       05/13/24 1053   Physical Activity   On average, how many days per week do you engage in moderate to strenuous exercise (like a brisk walk)? 0 days   On average, how many minutes do you engage in exercise at this level? 0 min   Financial Resource Strain   How hard is it for you to pay for the very basics like food, housing, medical care, and heating? Not hard   Housing Stability   In the last 12 months, was there a time when you were not able to pay the mortgage or rent on time? N   In the last 12 months, how many places have you lived? 1   In the last 12 months, was there a time when you did not have a steady place to sleep or slept in a shelter (including now)? N   Transportation Needs   In the past 12 months, has lack of transportation kept you from medical appointments or from getting medications? no   In the past 12 months, has lack of transportation kept you from meetings, work, or from getting things needed for daily living? No   Food Insecurity   Within the past 12 months, you worried that your food would run out before you got the money to buy more. Never true   Within the past 12 months, the food you bought just didn't last and you didn't have money to get more. Never true   Stress   Do you feel stress - tense, restless, nervous, or anxious, or unable to sleep at night because your mind is troubled all the time - these days? Not at all   Social Connections   In a typical week, how many times do you talk on the phone with family, friends, or neighbors? More than 3   How often do you get together with friends or relatives? More than 3   How often do you attend Pentecostalism or Buddhist services? More than 4  ( makes home visits for communion)   Do you belong to any clubs or organizations such as Pentecostalism groups, unions, fraternal or athletic groups, or school groups? No   How often do you attend  meetings of the clubs or organizations you belong to? Never   Are you , , , , never , or living with a partner?    Intimate Partner Violence   Within the last year, have you been afraid of your partner or ex-partner? No   Within the last year, have you been humiliated or emotionally abused in other ways by your partner or ex-partner? No   Within the last year, have you been kicked, hit, slapped, or otherwise physically hurt by your partner or ex-partner? No   Within the last year, have you been raped or forced to have any kind of sexual activity by your partner or ex-partner? No   Alcohol Use   Q1: How often do you have a drink containing alcohol? Never   Q2: How many drinks containing alcohol do you have on a typical day when you are drinking? None   Q3: How often do you have six or more drinks on one occasion? Never   Utilities   In the past 12 months has the electric, gas, oil, or water company threatened to shut off services in your home? No   Health Literacy   How often do you need to have someone help you when you read instructions, pamphlets, or other written material from your doctor or pharmacy? Sometimes         Maria Luz Soto RN

## 2024-05-13 NOTE — ED NOTES
Patient on 4L NC, patient reports that she does not normally wear oxygen.  Patient trialed on room air.      Erin Geiger RN  05/13/24 0818

## 2024-05-13 NOTE — ED NOTES
93 % on room air, PT in room and will spot check patient on exertion.     Erin Geiger RN  05/13/24 6925     Yes

## 2024-05-13 NOTE — PROGRESS NOTES
"Desiree Green is a 83 y.o. female on day 0 of admission presenting with CARMEL (acute kidney injury) (CMS-HCC).       05/13/24 1105   Current Planned Discharge Disposition   Current Planned Discharge Disposition Home     Patient lives with her  in a multi-level house. Patient does not use the stairs. Laundry in the basement, her  washes and dries, brings laundry baskets up for patient to sort and put away. She uses a walker to ambulate. She is independent with ADLs. Her  does the driving. She also has two adult daughters who help out. PCP is Vianey Phelps MD.  ADOD 05/13/2024  Patient's AMPAC scores are 17/PT and 18/OT. [facility] offered to set up C. Patient declined stating \"I did that about a year ago, OT, after a few sessions he said I really didn't need it\".     Plan is to discharge home with no needs,  will transport.     Maria Luz Soto RN      "

## 2024-05-13 NOTE — PROGRESS NOTES
Desiree Green is a 83 y.o. female on day 0 of admission presenting with CARMEL (acute kidney injury) (CMS-[facility]).       05/13/24 1100   ACS Disability Status   Are you deaf or do you have serious difficulty hearing? N   Are you blind or do you have serious difficulty seeing, even when wearing glasses? N   Because of a physical, mental, or emotional condition, do you have serious difficulty concentrating, remembering, or making decisions? (5 years old or older) N   Do you have serious difficulty walking or climbing stairs? Y   Do you have serious difficulty dressing or bathing? N   Because of a physical, mental, or emotional condition, do you have serious difficulty doing errands alone such as visiting the doctor? Y         Maria Luz Soto RN

## 2024-05-13 NOTE — PROGRESS NOTES
Occupational Therapy    OT Treatment    Patient Name: Desiree Green  MRN: [id number]  Today's Date: 5/13/2024  Time Calculation  Start Time: 0815  Stop Time: 0833  Time Calculation (min): 18 min         Assessment:  OT Assessment: 83 year-old female admitted from home with c/o headache and dizziness; (+) CARMEL. Patient is functioning close to her baseline, limited this date with hypoxia on room air, decreased strenght, decreased balance, decreased endurance, pancho benefit from continued OT intervention and low intensity rehab is recommended.  Prognosis: Good  Barriers to Discharge: None  Evaluation/Treatment Tolerance: Patient limited by fatigue, Other (Comment) (dizziness)  Medical Staff Made Aware: Yes  End of Session Communication: Bedside nurse  End of Session Patient Position: Up in chair, Alarm on  OT Assessment Results: Decreased ADL status, Decreased safe judgment during ADL, Decreased cognition, Decreased endurance, Decreased IADLs, Decreased functional mobility  Prognosis: Good  Barriers to Discharge: None  Evaluation/Treatment Tolerance: Patient limited by fatigue, Other (Comment) (dizziness)  Medical Staff Made Aware: Yes  Strengths: Support of Caregivers, Premorbid level of function  Barriers to Participation: Comorbidities  Plan:  Treatment Interventions: ADL retraining, Functional transfer training, UE strengthening/ROM, Endurance training, Cognitive reorientation, Patient/family training, Equipment evaluation/education, Compensatory technique education  OT Frequency: 4 times per week  OT Discharge Recommendations: Low intensity level of continued care, 24 hr supervision due to cognition  Equipment Recommended upon Discharge: Wheeled walker  OT Recommended Transfer Status: Assist of 1  OT - OK to Discharge: Yes  Treatment Interventions: ADL retraining, Functional transfer training, UE strengthening/ROM, Endurance training, Cognitive reorientation, Patient/family training, Equipment evaluation/education,  Compensatory technique education    Subjective   Previous Visit Info:  OT Last Visit  OT Received On: 05/13/24  General:  General  Reason for Referral: decreased ADL function.  Referred By: Dr. Lc DO  Past Medical History Relevant to Rehab: HLD, Afib, hysterectomy, schizophrenia, obesity, CKD, vit D deficiency  Family/Caregiver Present: No  Co-Treatment: PT  Co-Treatment Reason: initial evaluation of pt in ED; co-evaluation to maximize pt outcomes/safety  Patient Position Received: Bed, 2 rail up, On cart  Preferred Learning Style: verbal  General Comment: Cleared to see patient for OT, per nurse keep O2 off patient , but if she gets hypoxic, put 2 liters of O2 on.  Precautions:  Hearing/Visual Limitations: B hearing aids, not present at time of evaluation  Medical Precautions: Fall precautions, Oxygen therapy device and L/min (2 liters of O2)  Precautions Comment: Patient is a fall risk, recommend bed/cahir alarm for safety.  Vital Signs:  Vital Signs  Heart Rate: 82  BP: (!) 147/100  Patient Position: Lying  Pain:  Pain Assessment  Pain Assessment: 0-10  Pain Score: 0 - No pain    Objective    Cognition:  Cognition  Overall Cognitive Status: Impaired (for safety, insight)  Orientation Level: Disoriented to situation  Following Commands: Follows one step commands with increased time  Safety Judgment: Decreased awareness of need for assistance  Attention: Within Functional Limits  Memory: Within Funtional Limits  Problem Solving: Exceptions to WFL  Complex Functional Tasks: Impaired  Managing Finances: Impaired  Managing Medications: Impaired  Safety/Judgement: Exceptions to WFL  Complex Functional Tasks: Minimal  Insight: Mild  Processing Speed: Delayed  Coordination:  Movements are Fluid and Coordinated: No  Upper Body Coordination: Slower rate of movements    Bed Mobility/Transfers: Bed Mobility  Bed Mobility: Yes (Patient needed minimal assist to get to the edge of the cart this date, reports dizziness  this date on room air.)    Transfers  Transfer: Yes (Patient needed minimal assist this date to transfer from the cart and chair this date with the walker with minimal assist this date for safe hand positioning and safety.)    Sitting Balance:  Static Sitting Balance  Static Sitting-Comment/Number of Minutes: Close Supervision, 3-4min  Dynamic Sitting Balance  Dynamic Sitting-Comments: Close Supervision, 3-4min  Standing Balance:  Static Standing Balance  Static Standing-Comment/Number of Minutes: Minimal assist with the walker, F+/G, 2-3min  Dynamic Standing Balance  Dynamic Standing-Comments: MInimal assist with the walker, F+/G balance.    Strength:  Strength Comments: 3+/5 overall BUE.      Outcome Measures:[facility] Daily Activity  Putting on and taking off regular lower body clothing: A little  Bathing (including washing, rinsing, drying): A little  Putting on and taking off regular upper body clothing: A little  Toileting, which includes using toilet, bedpan or urinal: A little  Taking care of personal grooming such as brushing teeth: A little  Eating Meals: A little  Daily Activity - Total Score: 18        Education Documentation  ADL Training, taught by María Zabala OT at 5/13/2024 10:26 AM.  Learner: Patient  Readiness: Acceptance  Method: Explanation  Response: Verbalizes Understanding, Demonstrated Understanding, Needs Reinforcement  Comment: Patient was instructed in safe functional transfers/mobility with the walker.    Education Comments  No comments found.        OP EDUCATION:       Goals:  Encounter Problems       Encounter Problems (Active)       OT Goals       Patient will be able to complete UB/LB ADL of dressing, bathing, toileting , grooming with modified independence using good safety and with G balance. (Progressing)       Start:  05/13/24    Expected End:  05/27/24            Patient will be able to complete functional transfers/mobility with the walker with modified independence.  (Progressing)       Start:  05/13/24    Expected End:  05/27/24            Patient will be able to tolerate 10-12 min of functional standing with G balance in prep for ADL/transfers. (Progressing)       Start:  05/13/24    Expected End:  05/27/24

## 2024-05-13 NOTE — PROGRESS NOTES
Physical Therapy    Physical Therapy Evaluation    Patient Name: Desiree Green  MRN: [id number]  Today's Date: 5/13/2024      Assessment/Plan   PT Assessment  PT Assessment Results: Decreased strength, Decreased endurance, Impaired balance, Decreased mobility, Impaired judgement, Decreased safety awareness  Rehab Prognosis: Good  Evaluation/Treatment Tolerance: Patient tolerated treatment well  Medical Staff Made Aware: Yes  Strengths: Support and attitude of living partners  Barriers to Participation: Insight into problems  End of Session Communication: Bedside nurse  Assessment Comment: Pt demonstrates impaired functional mobility from baseline level; pt with mild balance/BLE strength deficits, decreased overall activity tolerance, and impaired safety awareness; would benefit from continued skilled PT services during hospital stay to maximize functional mobility outcomes and 24 hr supervision upon d/c.  End of Session Patient Position: Up in chair, Alarm on  IP OR SWING BED PT PLAN  Inpatient or Swing Bed: Inpatient  PT Plan  Treatment/Interventions: Bed mobility, Transfer training, Gait training, Balance training, Stair training, Neuromuscular re-education, Strengthening, Endurance training, Therapeutic exercise, Therapeutic activity  PT Plan: Skilled PT  PT Frequency: 3 times per week  PT Discharge Recommendations: Low intensity level of continued care, 24 hr supervision due to cognition  Equipment Recommended upon Discharge: Wheeled walker  PT Recommended Transfer Status: Assist x1  PT - OK to Discharge: Yes    Subjective   General Visit Information:  General  Reason for Referral: impaired functional mobility (Pt is an 83 year-old F admitted from home with c/o headache and dizziness; (+) CARMEL)  Referred By: Dr. Lc DO  Past Medical History Relevant to Rehab: HLD, Afib, hysterectomy, schizophrenia, obesity, CKD, vit D deficiency  Family/Caregiver Present: No  Co-Treatment: OT  Co-Treatment Reason: initial  evaluation of pt in ED; co-evaluation to maximize pt outcomes/safety  Prior to Session Communication: Bedside nurse  Patient Position Received: Bed, 2 rail up, On cart  Preferred Learning Style: verbal  General Comment: Pt cleared for therapy via RN, received in supine, NAD, agreeable to participate in therapy. (+) telemetry, IV  Home Living:  Home Living  Type of Home: House  Lives With: Spouse  Home Adaptive Equipment: Walker rolling or standard  Home Layout: Multi-level, Able to live on main level with bedroom/bathroom  Home Access: Stairs to enter with rails  Entrance Stairs-Rails: Both  Entrance Stairs-Number of Steps: 3  Bathroom Shower/Tub: Tub/shower unit  Bathroom Toilet: Standard  Bathroom Equipment: Grab bars in shower, Shower chair with back  Prior Level of Function:  Prior Function Per Pt/Caregiver Report  Level of [place]: Independent with ADLs and functional transfers, Independent with homemaking with ambulation  Receives Help From: Family  ADL Assistance: Independent  Homemaking Assistance: Independent  Ambulatory Assistance: Independent (mod indep with 2WW)  Vocational: Retired  Hand Dominance: Right  Prior Function Comments: denies h/o falls; spouse does laundry and driving; assists pt in and out of bathtub, otherwise pt performs i/ADLs without assistance  Precautions:  Precautions  Hearing/Visual Limitations: B hearing aids, not present at time of evaluation  Medical Precautions: Fall precautions  Vital Signs:  Vital Signs  Heart Rate: 79  Pulse Ox: (!) 90 % (on room air; RN instructed PT/OT to place pt back on 2L O2 via NC; SpO2 94-95% on 2L O2 via NC)  BP: 148/70  MAP (mmHg): 93    Objective   Pain:  Pain Assessment  Pain Assessment: 0-10  Pain Score: 0 - No pain  Cognition:  Cognition  Overall Cognitive Status: Within Functional Limits  Orientation Level: Oriented X4  Following Commands: Follows one step commands with increased time  Safety Judgment: Decreased awareness of need for safety  precautions  Insight: Mild    General Assessments:  General Observation  General Observation: pleasant/cooperative throughout    Activity Tolerance  Endurance: Tolerates 10 - 20 min exercise with multiple rests    Sensation  Light Touch: No apparent deficits  Sensation Comment: pt denies paresthesias    Strength  Strength Comments: BLE > 4-/5  Strength  Strength Comments: BLE > 4-/5    Coordination  Movements are Fluid and Coordinated: Yes    Postural Control  Postural Control: Impaired  Posture Comment: forward head    Static Sitting Balance  Static Sitting-Balance Support: Bilateral upper extremity supported, Feet supported  Static Sitting-Level of Assistance: Close supervision    Static Standing Balance  Static Standing-Balance Support: Bilateral upper extremity supported  Static Standing-Level of Assistance: Minimum assistance  Functional Assessments:     Bed Mobility  Bed Mobility: Yes  Bed Mobility 1  Bed Mobility 1: Supine to sitting  Level of Assistance 1: Minimum assistance  Bed Mobility Comments 1: assist for safety    Transfers  Transfer: Yes  Transfer 1  Transfer From 1: Sit to  Transfer to 1: Stand  Technique 1: Sit to stand  Transfer Device 1: Walker  Transfer Level of Assistance 1: Minimum assistance  Trials/Comments 1: assist for balance; cueing for sequencing/safety  Transfers 2  Transfer From 2: Stand to  Transfer to 2: Sit  Technique 2: Stand to sit  Transfer Device 2: Walker  Transfer Level of Assistance 2: Minimum assistance  Trials/Comments 2: assist for safe eccentric lowering into chair; cueing for proper hand placement    Ambulation/Gait Training  Ambulation/Gait Training Performed: Yes  Ambulation/Gait Training 1  Surface 1: Level tile  Device 1: Rolling walker  Assistance 1: Minimum assistance  Quality of Gait 1: Decreased step length (reciprocating pattern)  Comments/Distance (ft) 1: 3-4 steps to bedside chair    Stairs  Stairs: No    Extremity/Trunk Assessments:  RLE   RLE : Within  Functional Limits  LLE   LLE : Within Functional Limits  Outcome Measures:  [facility] Basic Mobility  Turning from your back to your side while in a flat bed without using bedrails: A little  Moving from lying on your back to sitting on the side of a flat bed without using bedrails: A little  Moving to and from bed to chair (including a wheelchair): A little  Standing up from a chair using your arms (e.g. wheelchair or bedside chair): A little  To walk in hospital room: A little  Climbing 3-5 steps with railing: A lot  Basic Mobility - Total Score: 17    Encounter Problems       Encounter Problems (Active)       Mobility       STG - Patient will ambulate 50' with rolling walker and modified independence. (Progressing)       Start:  05/13/24    Expected End:  06/10/24            STG - Patient will ascend and descend three stairs with use of B handrails and supervision for safety. (Not Progressing)       Start:  05/13/24    Expected End:  06/10/24               PT Transfers       STG - Patient to transfer to and from sit to supine with independence. (Progressing)       Start:  05/13/24    Expected End:  06/10/24            STG - Patient will transfer sit to and from stand with use of rolling walker and modified independence. (Progressing)       Start:  05/13/24    Expected End:  06/10/24               Safety       STG - Patient uses call light consistently to request assistance with transfers (Progressing)       Start:  05/13/24    Expected End:  06/10/24                   Education Documentation  Mobility Training, taught by Astrid Welch, PT at 5/13/2024  9:53 AM.  Learner: Patient  Readiness: Acceptance  Method: Explanation, Demonstration  Response: Needs Reinforcement    Education Comments  No comments found.

## 2024-05-14 ENCOUNTER — PHARMACY VISIT (OUTPATIENT)
Dept: PHARMACY | Facility: CLINIC | Age: 84
End: 2024-05-14
Payer: COMMERCIAL

## 2024-05-14 VITALS
HEART RATE: 69 BPM | OXYGEN SATURATION: 92 % | TEMPERATURE: 97.5 F | RESPIRATION RATE: 16 BRPM | WEIGHT: 198 LBS | DIASTOLIC BLOOD PRESSURE: 95 MMHG | HEIGHT: 63 IN | BODY MASS INDEX: 35.08 KG/M2 | SYSTOLIC BLOOD PRESSURE: 154 MMHG

## 2024-05-14 PROBLEM — N17.9 AKI (ACUTE KIDNEY INJURY) (CMS-HCC): Status: RESOLVED | Noted: 2024-05-12 | Resolved: 2024-05-14

## 2024-05-14 LAB
BACTERIA UR CULT: NORMAL
HOLD SPECIMEN: NORMAL
HOLD SPECIMEN: NORMAL
INR PPP: 2 (ref 0.9–1.2)
PROTHROMBIN TIME: 20.5 SECONDS (ref 9.3–12.7)

## 2024-05-14 PROCEDURE — G0378 HOSPITAL OBSERVATION PER HR: HCPCS

## 2024-05-14 PROCEDURE — 85610 PROTHROMBIN TIME: CPT | Performed by: INTERNAL MEDICINE

## 2024-05-14 PROCEDURE — 9420000001 HC RT PATIENT EDUCATION 5 MIN

## 2024-05-14 PROCEDURE — 36415 COLL VENOUS BLD VENIPUNCTURE: CPT | Performed by: INTERNAL MEDICINE

## 2024-05-14 PROCEDURE — RXMED WILLOW AMBULATORY MEDICATION CHARGE

## 2024-05-14 PROCEDURE — 94664 DEMO&/EVAL PT USE INHALER: CPT

## 2024-05-14 PROCEDURE — 94640 AIRWAY INHALATION TREATMENT: CPT

## 2024-05-14 PROCEDURE — 2500000001 HC RX 250 WO HCPCS SELF ADMINISTERED DRUGS (ALT 637 FOR MEDICARE OP): Performed by: NURSE PRACTITIONER

## 2024-05-14 PROCEDURE — 2500000001 HC RX 250 WO HCPCS SELF ADMINISTERED DRUGS (ALT 637 FOR MEDICARE OP): Performed by: INTERNAL MEDICINE

## 2024-05-14 PROCEDURE — 2500000004 HC RX 250 GENERAL PHARMACY W/ HCPCS (ALT 636 FOR OP/ED): Performed by: INTERNAL MEDICINE

## 2024-05-14 RX ORDER — CEFUROXIME AXETIL 500 MG/1
500 TABLET ORAL 2 TIMES DAILY
Qty: 8 TABLET | Refills: 0 | Status: SHIPPED | OUTPATIENT
Start: 2024-05-14 | End: 2024-05-18

## 2024-05-14 RX ADMIN — METOPROLOL TARTRATE 12.5 MG: 25 TABLET, FILM COATED ORAL at 08:49

## 2024-05-14 RX ADMIN — FLUTICASONE FUROATE AND VILANTEROL TRIFENATATE 1 PUFF: 200; 25 POWDER RESPIRATORY (INHALATION) at 07:54

## 2024-05-14 RX ADMIN — GABAPENTIN 300 MG: 300 CAPSULE ORAL at 08:48

## 2024-05-14 RX ADMIN — SODIUM CHLORIDE 100 ML/HR: 900 INJECTION, SOLUTION INTRAVENOUS at 08:50

## 2024-05-14 RX ADMIN — NORTRIPTYLINE HYDROCHLORIDE 25 MG: 25 CAPSULE ORAL at 08:48

## 2024-05-14 RX ADMIN — TIOTROPIUM BROMIDE INHALATION SPRAY 2 PUFF: 3.12 SPRAY, METERED RESPIRATORY (INHALATION) at 07:54

## 2024-05-14 ASSESSMENT — PAIN SCALES - GENERAL: PAINLEVEL_OUTOF10: 0 - NO PAIN

## 2024-05-14 ASSESSMENT — COGNITIVE AND FUNCTIONAL STATUS - GENERAL
PERSONAL GROOMING: A LITTLE
WALKING IN HOSPITAL ROOM: A LITTLE
MOBILITY SCORE: 20
DAILY ACTIVITIY SCORE: 23
CLIMB 3 TO 5 STEPS WITH RAILING: A LITTLE
STANDING UP FROM CHAIR USING ARMS: A LITTLE
MOVING TO AND FROM BED TO CHAIR: A LITTLE

## 2024-05-14 NOTE — PROGRESS NOTES
05/14/24 1004   Discharge Planning   Patient expects to be discharged to: Home with [facility]     Patient is ready for discharge today.  Is observation status. Met with patient at bedside. Patient would like to discharge with [facility]. Patient requested that this TCC speak with spouse to confirm [facility] agency that was used in the past.  Spoke with spouse via phone who confirmed patient has used [facility].  Spouse agrees that patient would benefit from [facility].  Referral sent. Will need an external referral for [facility] RN, PT, OT.

## 2024-05-14 NOTE — PROGRESS NOTES
"Desiree Green is a 83 y.o. female on day 0 of admission presenting with CARMEL (acute kidney injury) (CMS-[facility]).    Subjective   HPI   Patient seen and examined, was lying in her bed .  complains of low  back pain.  Denies any headache or dizziness .   Patient denies any chest pain, shortness of breath in room air.  Patient tolerates well her diet with no nausea vomiting or abdominal pain.  No fever or chills.      Objective     Last Recorded Vitals  Blood pressure (!) 154/95, pulse 69, temperature 36.4 °C (97.5 °F), temperature source Oral, resp. rate 16, height 1.6 m (5' 3\"), weight 89.8 kg (198 lb), SpO2 92%.      Intake/Output Summary (Last 24 hours) at 5/14/2024 1027  Last data filed at 5/14/2024 0849  Gross per 24 hour   Intake 1701.66 ml   Output --   Net 1701.66 ml         Physical Exam  Vitals and nursing note reviewed.   Constitutional:       Appearance: She is obese.   HENT:      Head: Normocephalic and atraumatic.      Nose: Nose normal. No congestion or rhinorrhea.      Mouth/Throat:      Mouth: Mucous membranes are moist.   Eyes:      Extraocular Movements: Extraocular movements intact.      Pupils: Pupils are equal, round, and reactive to light.   Cardiovascular:      Rate and Rhythm: Normal rate.      Pulses: Normal pulses.   Pulmonary:      Effort: Pulmonary effort is normal. No respiratory distress.      Breath sounds: Normal breath sounds. No wheezing, rhonchi or rales.   Chest:      Chest wall: No tenderness.   Abdominal:      General: Bowel sounds are normal. There is no distension.      Palpations: Abdomen is soft.   Musculoskeletal:         General: No swelling. Normal range of motion.      Cervical back: Normal range of motion and neck supple. No tenderness.      Right lower leg: No edema.      Left lower leg: No edema.   Lymphadenopathy:      Cervical: No cervical adenopathy.   Skin:     General: Skin is warm.   Neurological:      General: No focal deficit present.      Mental Status: She is " alert. Mental status is at baseline.   Psychiatric:         Mood and Affect: Mood normal.         Behavior: Behavior normal.          Relevant Results    Lab Results   Component Value Date    WBC 10.6 05/13/2024    HGB 12.6 05/13/2024    HCT 41.3 05/13/2024    MCV 96 05/13/2024     05/13/2024       Lab Results   Component Value Date    GLUCOSE 91 05/13/2024    CALCIUM 8.4 (L) 05/13/2024     05/13/2024    K 4.2 05/13/2024    CO2 23 (L) 05/13/2024     (H) 05/13/2024    BUN 25 05/13/2024    CREATININE 1.60 05/13/2024       Lab Results   Component Value Date    HGBA1C 5.5 04/21/2022       ECG 12 lead    Result Date: 5/13/2024   Poor data quality, interpretation may be adversely affected Normal sinus rhythm Prolonged QT Abnormal ECG No previous ECGs available Confirmed by Jose Bender ([id number]) on 5/13/2024 10:10:45 AM    XR chest 1 view    Result Date: 5/12/2024  Interpreted By:  Vinay Arcos, STUDY: XR CHEST 1 VIEW;  5/12/2024 5:32 pm   INDICATION: Signs/Symptoms:malaise.   COMPARISON: Chest radiograph 05/04/2022.   ACCESSION NUMBER(S): [id number]   ORDERING CLINICIAN: EMMANUEL ROSALES   FINDINGS:   CARDIOMEDIASTINAL SILHOUETTE: Cardiomediastinal silhouette is normal in size and configuration.   LUNGS/PLEURA: There are no consolidations.There are no pleural effusions. There is elevation of the right hemidiaphragm similar to prior. There is no demonstrated pneumothorax.       BONES: No evidence of acute osseous abnormality.       1.  No evidence of acute cardiopulmonary process.     Signed by: Vinay Arcos 5/12/2024 6:03 PM Dictation workstation:   [id number]    CT head wo IV contrast    Result Date: 5/12/2024  Interpreted By:  George Read, STUDY: CT HEAD WO IV CONTRAST;  5/12/2024 4:46 pm   INDICATION: Signs/Symptoms:headache, dizziness.   COMPARISON: 03/26/2022   ACCESSION NUMBER(S): [id number]   ORDERING CLINICIAN: AJ HODGES   TECHNIQUE: Sequential trans axial images were obtained  .    FINDINGS: INTRACRANIAL:   There is mild prominence of the cortical sulci indicating atrophy.   There is mild ventriculomegaly, again consistent with atrophy.   Mild decreased attenuation of the periventricular and long tracks of the white matter most consistent with gliosis from arterial disease. There is no evidence of definite subacute infarction, intracranial hemorrhage or mass.     EXTRACRANIAL: Visualized paranasal sinuses and mastoids are clear. The calvarium is intact.       Mild age related degenerative change as described without acute findings or significant change from the prior exam.   Signed by: George Read 5/12/2024 4:57 PM Dictation workstation:   [id number]    Scheduled medications  cefTRIAXone, 1 g, intravenous, q24h ROBERT  fluticasone furoate-vilanteroL, 1 puff, inhalation, Daily  gabapentin, 300 mg, oral, BID  metoprolol tartrate, 12.5 mg, oral, BID  nortriptyline, 25 mg, oral, Daily  tiotropium, 2 puff, inhalation, Daily  trifluoperazine, 5 mg, oral, Nightly  warfarin, 3 mg, oral, Once per day on Monday Wednesday Friday      Continuous medications  sodium chloride 0.9%, 100 mL/hr, Last Rate: 100 mL/hr (05/14/24 0850)      PRN medications  PRN medications: acetaminophen **OR** acetaminophen **OR** acetaminophen, benzocaine-menthol, guaiFENesin, ipratropium-albuteroL, meclizine, melatonin, ondansetron ODT **OR** ondansetron    Assessment/Plan   Acute kidney injury  Resolved      Headache  No headache today   improved with  Tylenol   CT normal.  Continue Tylenol as needed.     Urinary tract infection  On  ceftriaxone for now and follow urine culture.     Schizophrenia  Stable.  continue with  home medication     Weakness/deconditioning   Fall precautions  PT/OT      Discharge plan:  Anticipate discharging patient with external home with home health care   I spent 35 minutes in the professional and overall care of this patient.    Neilda Cesar, APRN-CNP

## 2024-05-14 NOTE — DISCHARGE SUMMARY
Discharge Diagnosis  CARMEL (acute kidney injury) (CMS-[facility])  Problem   Carmel (Acute Kidney Injury) ([facility]ms-[facility]) (Resolved)         Issues Requiring Follow-Up  Follow up with your PCP ALESSANDRO Martinez, within 1 week     Imaging results   ECG 12 lead    Result Date: 5/13/2024  Poor data quality, interpretation may be adversely affected Normal sinus rhythm Prolonged QT Abnormal ECG No previous ECGs available Confirmed by Jose Bender ([id number]) on 5/13/2024 10:10:45 AM    XR chest 1 view    Result Date: 5/12/2024  Interpreted By:  Vinay Arcos, STUDY: XR CHEST 1 VIEW;  5/12/2024 5:32 pm   INDICATION: Signs/Symptoms:malaise.   COMPARISON: Chest radiograph 05/04/2022.   ACCESSION NUMBER(S): [id number]   ORDERING CLINICIAN: EMMANUEL ROSALES   FINDINGS:   CARDIOMEDIASTINAL SILHOUETTE: Cardiomediastinal silhouette is normal in size and configuration.   LUNGS/PLEURA: There are no consolidations.There are no pleural effusions. There is elevation of the right hemidiaphragm similar to prior. There is no demonstrated pneumothorax.       BONES: No evidence of acute osseous abnormality.       1.  No evidence of acute cardiopulmonary process.     Signed by: Vinay Arcos 5/12/2024 6:03 PM Dictation workstation:   [id number]    CT head wo IV contrast    Result Date: 5/12/2024  Interpreted By:  George Read, STUDY: CT HEAD WO IV CONTRAST;  5/12/2024 4:46 pm   INDICATION: Signs/Symptoms:headache, dizziness.   COMPARISON: 03/26/2022   ACCESSION NUMBER(S): [id number]   ORDERING CLINICIAN: AJ HODGES   TECHNIQUE: Sequential trans axial images were obtained  .   FINDINGS: INTRACRANIAL:   There is mild prominence of the cortical sulci indicating atrophy.   There is mild ventriculomegaly, again consistent with atrophy.   Mild decreased attenuation of the periventricular and long tracks of the white matter most consistent with gliosis from arterial disease. There is no evidence of definite subacute infarction, intracranial hemorrhage  or mass.     EXTRACRANIAL: Visualized paranasal sinuses and mastoids are clear. The calvarium is intact.       Mild age related degenerative change as described without acute findings or significant change from the prior exam.   Signed by: George Read 5/12/2024 4:57 PM Dictation workstation:   [id number]      Hospital Course  From [facility] on admission:   Desiree Green is a 83 y.o. female presenting with headache and dizziness.  Patient has underlying vertigo today.  She had a severe headache starting at the back of her skull spreading into the forehead; she typically does not get migraines.  She went to the bathroom because of this reason, at this point she said that she was getting some blurry vision in bilateral eyes, now resolved.  No facial droop.  No dysarthria.  No unilateral weakness.  Family tells me that she has not been eating and drinking as much, and they have to encourage by mouth intake.  Normally ambulates with a cane.  In the emergency department, she was given a liter normal saline, Zofran, Tylenol with improvement in symptoms.  She generally feels better since coming in.     Brief Hospital Course:   81 years old female with controlled schizophrenia presented with headache admitted with CARMEL and dehydration.  Kidney function improved with with IV fluid.  Headache was improving with Tylenol and IV fluids.  Initially required 2 L nasal cannula and now she is on room air.  Patient will discharge with external home health care RN, PT OT.  Patient is hemodynamically stable.    Physical exam  Physical Exam  General: alert, no diaphoresis   HENT: mucous membranes moist, external ears normal, no rhinorrhea   Eyes: no icterus or injection, no discharge   Lungs: CTA BL   Heart: RRR, no murmurs, no LE edema BL   GI: abdomen soft, nontender, nondistended, BS present   MSK: no joint effusion or deformity   Skin: no rashes, erythema, or ecchymosis   Neuro: grossly normal cognition, motor strength, sensation      Relevant Results    Lab Results   Component Value Date    WBC 10.6 05/13/2024    HGB 12.6 05/13/2024    HCT 41.3 05/13/2024    MCV 96 05/13/2024     05/13/2024     Lab Results   Component Value Date    GLUCOSE 91 05/13/2024    CALCIUM 8.4 (L) 05/13/2024     05/13/2024    K 4.2 05/13/2024    CO2 23 (L) 05/13/2024     (H) 05/13/2024    BUN 25 05/13/2024    CREATININE 1.60 05/13/2024     Lab Results   Component Value Date    INR 2.0 (H) 05/14/2024    INR 2.7 (H) 05/13/2024    INR 3.1 (H) 05/12/2024    PROTIME 20.5 (H) 05/14/2024    PROTIME 27.1 (H) 05/13/2024    PROTIME 30.3 (H) 05/12/2024            Medication List      START taking these medications     cefuroxime 500 mg tablet; Commonly known as: Ceftin; Take 1 tablet (500   mg) by mouth 2 times a day for 4 days.     CONTINUE taking these medications     albuterol 90 mcg/actuation inhaler   Breo Ellipta 200-25 mcg/dose inhaler; Generic drug: fluticasone   furoate-vilanteroL   gabapentin 300 mg capsule; Commonly known as: Neurontin   meclizine 25 mg tablet; Commonly known as: Antivert; Take 1 tablet (25   mg) by mouth once daily as needed for dizziness.   metoprolol tartrate 25 mg tablet; Commonly known as: Lopressor   nortriptyline 25 mg capsule; Commonly known as: Pamelor   trifluoperazine 5 mg tablet; Commonly known as: Stelazine   * warfarin 6 mg tablet; Commonly known as: Coumadin; TAKE 1 TABLET BY   MOUTH 3 TIMES  WEEKLY ON MONDAY, WEDNESDAY,  THURSDAY   * warfarin 3 mg tablet; Commonly known as: Coumadin; TAKE 1 TABLET BY   MOUTH 4 TIMES  WEEKLY TUESDAY FRIDAY SATURDAY SUNDAY  * This list has 2 medication(s) that are the same as other medications   prescribed for you. Read the directions carefully, and ask your doctor or   other care provider to review them with you.     STOP taking these medications     acetaminophen 325 mg tablet; Commonly known as: Tylenol   multivitamin tablet   Tylenol 8 Hour 650 mg ER tablet; Generic drug:  acetaminophen   venlafaxine XR 37.5 mg 24 hr capsule; Commonly known as: Effexor-XR       Outpatient Follow-Up  Future Appointments   Date Time Provider Department Center   5/15/2024 11:00 AM ALESSANDRO Martinez [id number] [facility]         Time overall spent on discharge summary 55 minutes    SHARLA DavidCNP

## 2024-05-15 ENCOUNTER — OFFICE VISIT (OUTPATIENT)
Dept: PRIMARY CARE | Facility: CLINIC | Age: 84
End: 2024-05-15
Payer: MEDICARE

## 2024-05-15 ENCOUNTER — PATIENT OUTREACH (OUTPATIENT)
Dept: PRIMARY CARE | Facility: CLINIC | Age: 84
End: 2024-05-15
Payer: MEDICARE

## 2024-05-15 VITALS — DIASTOLIC BLOOD PRESSURE: 88 MMHG | HEART RATE: 91 BPM | SYSTOLIC BLOOD PRESSURE: 138 MMHG | OXYGEN SATURATION: 94 %

## 2024-05-15 DIAGNOSIS — I48.0 PAROXYSMAL ATRIAL FIBRILLATION (MULTI): ICD-10-CM

## 2024-05-15 DIAGNOSIS — N18.32 STAGE 3B CHRONIC KIDNEY DISEASE (MULTI): ICD-10-CM

## 2024-05-15 DIAGNOSIS — F20.9 SCHIZOPHRENIA, UNSPECIFIED TYPE (MULTI): ICD-10-CM

## 2024-05-15 DIAGNOSIS — Z72.3 LACK OF PHYSICAL ACTIVITY: ICD-10-CM

## 2024-05-15 DIAGNOSIS — I10 PRIMARY HYPERTENSION: ICD-10-CM

## 2024-05-15 DIAGNOSIS — Z09 HOSPITAL DISCHARGE FOLLOW-UP: Primary | ICD-10-CM

## 2024-05-15 DIAGNOSIS — N17.9 AKI (ACUTE KIDNEY INJURY) (CMS-HCC): ICD-10-CM

## 2024-05-15 PROCEDURE — 1036F TOBACCO NON-USER: CPT

## 2024-05-15 PROCEDURE — 1157F ADVNC CARE PLAN IN RCRD: CPT

## 2024-05-15 PROCEDURE — 1124F ACP DISCUSS-NO DSCNMKR DOCD: CPT

## 2024-05-15 PROCEDURE — 3075F SYST BP GE 130 - 139MM HG: CPT

## 2024-05-15 PROCEDURE — 1159F MED LIST DOCD IN RCRD: CPT

## 2024-05-15 PROCEDURE — 99496 TRANSJ CARE MGMT HIGH F2F 7D: CPT

## 2024-05-15 PROCEDURE — 3079F DIAST BP 80-89 MM HG: CPT

## 2024-05-15 PROCEDURE — 1125F AMNT PAIN NOTED PAIN PRSNT: CPT

## 2024-05-15 ASSESSMENT — ENCOUNTER SYMPTOMS
LOSS OF SENSATION IN FEET: 0
DEPRESSION: 0
OCCASIONAL FEELINGS OF UNSTEADINESS: 1

## 2024-05-15 ASSESSMENT — LIFESTYLE VARIABLES
SKIP TO QUESTIONS 9-10: 0
HOW MANY STANDARD DRINKS CONTAINING ALCOHOL DO YOU HAVE ON A TYPICAL DAY: PATIENT DECLINED
HOW OFTEN DO YOU HAVE A DRINK CONTAINING ALCOHOL: PATIENT DECLINED
AUDIT-C TOTAL SCORE: -1
HOW OFTEN DO YOU HAVE SIX OR MORE DRINKS ON ONE OCCASION: PATIENT DECLINED

## 2024-05-15 ASSESSMENT — PATIENT HEALTH QUESTIONNAIRE - PHQ9
SUM OF ALL RESPONSES TO PHQ9 QUESTIONS 1 AND 2: 0
2. FEELING DOWN, DEPRESSED OR HOPELESS: NOT AT ALL
1. LITTLE INTEREST OR PLEASURE IN DOING THINGS: NOT AT ALL

## 2024-05-15 ASSESSMENT — PAIN SCALES - GENERAL: PAINLEVEL: 3

## 2024-05-15 NOTE — PROGRESS NOTES
Subjective   Patient ID: Desiree Green is a 83 y.o. female who presents for Hosptial follow up.    HPI   Patient presents for hospital follow-up today she presented with headache and dizziness she did have underlying vertigo that day she has severe headache starting at the back of her skull spreading to her forehead typically does not get migraines.  She went to the bathroom because of this reason at that point she said that she was getting blurry vision in bilateral eyes that had not resolved prior to her admission to the emergency room.  At that time she had no facial droop no dysarthria no unilateral weakness.  Per the family they told her that she had not been eating and drinking as much as they have to encourage her oral intake.  She ambulates with a cane she was given a liter of normal saline Zofran and Tylenol with improvement of her symptoms stated she generally felt better.  He was admitted to the hospital for CARMEL and dehydration kidney function improved with IV fluids headache improved with Tylenol and fluids.  She initially was on 2 L nasal cannula and then ultimately moved to room air she was discharged home with home health PT OT.  Patient stated today she is just feeling fatigued otherwise she is feeling okay.  Hospital do not refer to nephrology for CKD.  The daughter is concerned because they stopped her Effexor last dose taken was Sunday.  Also told her to stop Tylenol.  She would like to know if she can take it intermittently.  Review of Systems  Review of Systems negative except as noted in HPI and Chief complaint.    Current Outpatient Medications:     albuterol 90 mcg/actuation inhaler, Inhale 1 puff every 4 hours if needed., Disp: , Rfl:     Breo Ellipta 200-25 mcg/dose inhaler, Inhale 1 puff once daily., Disp: , Rfl:     cefuroxime (Ceftin) 500 mg tablet, Take 1 tablet (500 mg) by mouth 2 times a day for 4 days., Disp: 8 tablet, Rfl: 0    gabapentin (Neurontin) 300 mg capsule, Take 1 capsule  (300 mg) by mouth 2 times a day., Disp: , Rfl:     meclizine (Antivert) 25 mg tablet, Take 1 tablet (25 mg) by mouth once daily as needed for dizziness., Disp: 90 tablet, Rfl: 1    metoprolol tartrate (Lopressor) 25 mg tablet, Take 0.5 tablets (12.5 mg) by mouth 2 times a day., Disp: , Rfl:     nortriptyline (Pamelor) 25 mg capsule, Take 1 capsule (25 mg) by mouth once daily at bedtime., Disp: , Rfl:     trifluoperazine (Stelazine) 5 mg tablet, once every 24 hours. TAKE 1 TABLET M/W/F/SAT AND 2 TABLETS T/TR/SUN, Disp: , Rfl:     warfarin (Coumadin) 3 mg tablet, TAKE 1 TABLET BY MOUTH 4 TIMES  WEEKLY TUESDAY FRIDAY SATURDAY SUNDAY, Disp: 48 tablet, Rfl: 3    warfarin (Coumadin) 6 mg tablet, TAKE 1 TABLET BY MOUTH 3 TIMES  WEEKLY ON MONDAY, WEDNESDAY,  THURSDAY, Disp: 36 tablet, Rfl: 3  No current facility-administered medications for this visit.    Objective   /88   Pulse 91   SpO2 94%     Physical Exam  Vitals reviewed.   Cardiovascular:      Rate and Rhythm: Normal rate.   Pulmonary:      Effort: Pulmonary effort is normal.   Musculoskeletal:         General: Normal range of motion.      Cervical back: Normal range of motion.   Neurological:      General: No focal deficit present.      Mental Status: She is alert.   Psychiatric:         Mood and Affect: Mood normal.       Assessment/Plan   Diagnoses and all orders for this visit:  Hospital discharge follow-up  CARMEL (acute kidney injury) ([facility])  -     Referral to Primary Care - Family Practice  Stage 3b chronic kidney disease (Multi)  -     Referral to Primary Care - Family Practice  -     Referral to Nephrology; Future  Lack of physical activity  Paroxysmal atrial fibrillation (Multi)  Primary hypertension  Schizophrenia, unspecified type (Multi)    HYPERTENSION:   Decrease intake of processed foods, fast foods, lunch meat, canned soups, canned veggies.  Increase intake of fresh fruits, veggies, and lean meats. Monitor blood pressure at home, keep a log  and bring this with you to your next appointment. Call the office if your blood pressure runs 150/90 or higher consistently.  Blood Pressure Technique:  Sit quietly in a chair for 5 minutes with back supported and feet on the floor  Then place left arm on a table or armrest so bicep area is at the same level of heart or left breast  Check three times in a row, disregard the highest reading and average the other two  CKD  I have placed a referral to nephrology  for further management CKD   Take Tylenol sparingly as needed.  Schizophrenia  Continue Effexor daily.    Advanced care planning was discussed with Desiree Green today. We reviewed code status, Medical Power of , and Living will. Pt does not have  LW or POA.   *This note was dictated using DRAGON speech recognition software and was corrected for spelling or grammatical errors, but despite proofreading several typographical errors might be present that might affect the meaning of the content.*  Vianey Phelps CNP    Time Spent  Prep time on day of patient encounter: 5 minutes  Time spent directly with patient, family or caregiver: 15 minutes  Documentation Time: 5 minutes

## 2024-05-15 NOTE — PROGRESS NOTES
Discharge Facility: [facility]  Discharge Diagnosis: Acute Kidney Injury  Admission Date: 5/13/24  Discharge Date: 5/14/24    PCP Appointment Date: 5/15/24  Specialist Appointment Date: unknown  Hospital Encounter and Summary: Linked     Pt has an appointment within 2 days    Benoit Martinez LPN

## 2024-05-15 NOTE — PATIENT INSTRUCTIONS
HYPERTENSION:   Decrease intake of processed foods, fast foods, lunch meat, canned soups, canned veggies.  Increase intake of fresh fruits, veggies, and lean meats. Monitor blood pressure at home, keep a log and bring this with you to your next appointment. Call the office if your blood pressure runs 150/90 or higher consistently.  Blood Pressure Technique:  Sit quietly in a chair for 5 minutes with back supported and feet on the floor  Then place left arm on a table or armrest so bicep area is at the same level of heart or left breast  Check three times in a row, disregard the highest reading and average the other two    I have placed a referral to nephrology  for further management CKD

## 2024-05-17 ENCOUNTER — PATIENT OUTREACH (OUTPATIENT)
Dept: PRIMARY CARE | Facility: CLINIC | Age: 84
End: 2024-05-17
Payer: MEDICARE

## 2024-05-17 NOTE — PROGRESS NOTES
Unable to reach patient for call back after patient's follow up appointment with PCP.   LVM with call back number for patient to call if needed   If no voicemail available call attempts x 2 were made to contact the patient to assist with any questions or concerns patient may have.     Benoit Martinez LPN

## 2024-05-24 DIAGNOSIS — J44.9 CHRONIC OBSTRUCTIVE PULMONARY DISEASE, UNSPECIFIED COPD TYPE (MULTI): Primary | ICD-10-CM

## 2024-05-28 ENCOUNTER — TELEPHONE (OUTPATIENT)
Dept: PRIMARY CARE | Facility: CLINIC | Age: 84
End: 2024-05-28
Payer: MEDICARE

## 2024-05-28 NOTE — TELEPHONE ENCOUNTER
Debbie rodríguez nurse from [facility] ([phone]) called today stating that they did her INR on 05/23/24 which was 3.7 and said that it was hard to get through to our office. Current dosage is 6 mg Mon, Wed, Thu and 3 mg Tue, Fri, Sat and Sun. Please advise. Call  with dosage adjustment.

## 2024-05-29 DIAGNOSIS — R42 DIZZINESS: ICD-10-CM

## 2024-05-29 RX ORDER — MECLIZINE HYDROCHLORIDE 25 MG/1
TABLET ORAL
Qty: 90 TABLET | Refills: 1 | Status: SHIPPED | OUTPATIENT
Start: 2024-05-29

## 2024-05-30 ENCOUNTER — APPOINTMENT (OUTPATIENT)
Dept: PRIMARY CARE | Facility: CLINIC | Age: 84
End: 2024-05-30
Payer: MEDICARE

## 2024-06-06 ENCOUNTER — TELEPHONE (OUTPATIENT)
Dept: PRIMARY CARE | Facility: CLINIC | Age: 84
End: 2024-06-06
Payer: MEDICARE

## 2024-06-06 NOTE — TELEPHONE ENCOUNTER
Stacy from [facility] called with patient's INT from today which is 2.2 patient's current dosage is 6 mg Mon, Wed, Thu and 3 mg Tue, Fri, Sat and Sun. She stopped one dose of 3 mg and took all other doses as prescribed. Please call Stacy at [phone] and patient's  with any dosage adjustments.

## 2024-06-06 NOTE — TELEPHONE ENCOUNTER
Spoke with both Stacy at [facility] and patient's  and let them know the message.

## 2024-06-07 ENCOUNTER — LAB (OUTPATIENT)
Dept: LAB | Facility: LAB | Age: 84
End: 2024-06-07
Payer: MEDICARE

## 2024-06-07 ENCOUNTER — OFFICE VISIT (OUTPATIENT)
Dept: PRIMARY CARE | Facility: CLINIC | Age: 84
End: 2024-06-07
Payer: MEDICARE

## 2024-06-07 VITALS
WEIGHT: 200.6 LBS | BODY MASS INDEX: 35.53 KG/M2 | OXYGEN SATURATION: 99 % | HEART RATE: 100 BPM | SYSTOLIC BLOOD PRESSURE: 148 MMHG | DIASTOLIC BLOOD PRESSURE: 88 MMHG

## 2024-06-07 DIAGNOSIS — Z00.00 MEDICARE ANNUAL WELLNESS VISIT, SUBSEQUENT: Primary | ICD-10-CM

## 2024-06-07 DIAGNOSIS — I48.0 PAROXYSMAL ATRIAL FIBRILLATION (MULTI): ICD-10-CM

## 2024-06-07 DIAGNOSIS — E78.2 MIXED HYPERLIPIDEMIA: ICD-10-CM

## 2024-06-07 DIAGNOSIS — E55.9 VITAMIN D DEFICIENCY: ICD-10-CM

## 2024-06-07 DIAGNOSIS — Z00.00 MEDICARE ANNUAL WELLNESS VISIT, SUBSEQUENT: ICD-10-CM

## 2024-06-07 DIAGNOSIS — N18.30 STAGE 3 CHRONIC KIDNEY DISEASE, UNSPECIFIED WHETHER STAGE 3A OR 3B CKD (MULTI): ICD-10-CM

## 2024-06-07 LAB
25(OH)D3 SERPL-MCNC: 66 NG/ML (ref 31–100)
ALBUMIN SERPL-MCNC: 4.1 G/DL (ref 3.5–5)
ALP BLD-CCNC: 110 U/L (ref 35–125)
ALT SERPL-CCNC: 17 U/L (ref 5–40)
ANION GAP SERPL CALC-SCNC: 15 MMOL/L
AST SERPL-CCNC: 23 U/L (ref 5–40)
BASOPHILS # BLD AUTO: 0.08 X10*3/UL (ref 0–0.1)
BASOPHILS NFR BLD AUTO: 1 %
BILIRUB SERPL-MCNC: 0.7 MG/DL (ref 0.1–1.2)
BUN SERPL-MCNC: 19 MG/DL (ref 8–25)
CALCIUM SERPL-MCNC: 9.1 MG/DL (ref 8.5–10.4)
CHLORIDE SERPL-SCNC: 104 MMOL/L (ref 97–107)
CHOLEST SERPL-MCNC: 171 MG/DL (ref 133–200)
CHOLEST/HDLC SERPL: 2.6 {RATIO}
CO2 SERPL-SCNC: 22 MMOL/L (ref 24–31)
CREAT SERPL-MCNC: 1.3 MG/DL (ref 0.4–1.6)
EGFRCR SERPLBLD CKD-EPI 2021: 41 ML/MIN/1.73M*2
EOSINOPHIL # BLD AUTO: 0.1 X10*3/UL (ref 0–0.4)
EOSINOPHIL NFR BLD AUTO: 1.2 %
ERYTHROCYTE [DISTWIDTH] IN BLOOD BY AUTOMATED COUNT: 15.8 % (ref 11.5–14.5)
EST. AVERAGE GLUCOSE BLD GHB EST-MCNC: 120 MG/DL
GLUCOSE SERPL-MCNC: 113 MG/DL (ref 65–99)
HBA1C MFR BLD: 5.8 %
HCT VFR BLD AUTO: 44.7 % (ref 36–46)
HDLC SERPL-MCNC: 66 MG/DL
HGB BLD-MCNC: 14.1 G/DL (ref 12–16)
IMM GRANULOCYTES # BLD AUTO: 0.04 X10*3/UL (ref 0–0.5)
IMM GRANULOCYTES NFR BLD AUTO: 0.5 % (ref 0–0.9)
LDLC SERPL CALC-MCNC: 82 MG/DL (ref 65–130)
LYMPHOCYTES # BLD AUTO: 1.42 X10*3/UL (ref 0.8–3)
LYMPHOCYTES NFR BLD AUTO: 17.6 %
MCH RBC QN AUTO: 29.3 PG (ref 26–34)
MCHC RBC AUTO-ENTMCNC: 31.5 G/DL (ref 32–36)
MCV RBC AUTO: 93 FL (ref 80–100)
MONOCYTES # BLD AUTO: 0.59 X10*3/UL (ref 0.05–0.8)
MONOCYTES NFR BLD AUTO: 7.3 %
NEUTROPHILS # BLD AUTO: 5.86 X10*3/UL (ref 1.6–5.5)
NEUTROPHILS NFR BLD AUTO: 72.4 %
NRBC BLD-RTO: 0 /100 WBCS (ref 0–0)
PLATELET # BLD AUTO: 358 X10*3/UL (ref 150–450)
POTASSIUM SERPL-SCNC: 4.3 MMOL/L (ref 3.4–5.1)
PROT SERPL-MCNC: 6.6 G/DL (ref 5.9–7.9)
RBC # BLD AUTO: 4.82 X10*6/UL (ref 4–5.2)
SODIUM SERPL-SCNC: 141 MMOL/L (ref 133–145)
TRIGL SERPL-MCNC: 117 MG/DL (ref 40–150)
WBC # BLD AUTO: 8.1 X10*3/UL (ref 4.4–11.3)

## 2024-06-07 PROCEDURE — 99215 OFFICE O/P EST HI 40 MIN: CPT

## 2024-06-07 PROCEDURE — 1159F MED LIST DOCD IN RCRD: CPT

## 2024-06-07 PROCEDURE — 1125F AMNT PAIN NOTED PAIN PRSNT: CPT

## 2024-06-07 PROCEDURE — 1124F ACP DISCUSS-NO DSCNMKR DOCD: CPT

## 2024-06-07 PROCEDURE — 1036F TOBACCO NON-USER: CPT

## 2024-06-07 PROCEDURE — 80053 COMPREHEN METABOLIC PANEL: CPT

## 2024-06-07 PROCEDURE — 36415 COLL VENOUS BLD VENIPUNCTURE: CPT

## 2024-06-07 PROCEDURE — G0439 PPPS, SUBSEQ VISIT: HCPCS

## 2024-06-07 PROCEDURE — 82306 VITAMIN D 25 HYDROXY: CPT

## 2024-06-07 PROCEDURE — 83036 HEMOGLOBIN GLYCOSYLATED A1C: CPT

## 2024-06-07 PROCEDURE — 80061 LIPID PANEL: CPT

## 2024-06-07 PROCEDURE — 85025 COMPLETE CBC W/AUTO DIFF WBC: CPT

## 2024-06-07 PROCEDURE — 1157F ADVNC CARE PLAN IN RCRD: CPT

## 2024-06-07 RX ORDER — SIMVASTATIN 40 MG/1
40 TABLET, FILM COATED ORAL DAILY
COMMUNITY
Start: 2024-05-16 | End: 2024-06-07 | Stop reason: SDUPTHER

## 2024-06-07 RX ORDER — SIMVASTATIN 40 MG/1
40 TABLET, FILM COATED ORAL DAILY
Qty: 30 TABLET | Refills: 0 | Status: SHIPPED | OUTPATIENT
Start: 2024-06-07

## 2024-06-07 RX ORDER — SIMVASTATIN 40 MG/1
40 TABLET, FILM COATED ORAL DAILY
Qty: 90 TABLET | Refills: 1 | Status: SHIPPED | OUTPATIENT
Start: 2024-06-07 | End: 2024-06-07 | Stop reason: SDUPTHER

## 2024-06-07 ASSESSMENT — LIFESTYLE VARIABLES
SKIP TO QUESTIONS 9-10: 1
HOW OFTEN DO YOU HAVE SIX OR MORE DRINKS ON ONE OCCASION: NEVER
HOW OFTEN DO YOU HAVE A DRINK CONTAINING ALCOHOL: NEVER
AUDIT-C TOTAL SCORE: 0
HOW MANY STANDARD DRINKS CONTAINING ALCOHOL DO YOU HAVE ON A TYPICAL DAY: PATIENT DOES NOT DRINK

## 2024-06-07 ASSESSMENT — ENCOUNTER SYMPTOMS
LOSS OF SENSATION IN FEET: 1
DEPRESSION: 0
OCCASIONAL FEELINGS OF UNSTEADINESS: 1

## 2024-06-07 ASSESSMENT — PAIN SCALES - GENERAL: PAINLEVEL: 5

## 2024-06-07 NOTE — PATIENT INSTRUCTIONS
LAB Order/ BLOOD TESTS   I have ordered lab work for you to get done. This should be fasting. Nothing to eat or drink after midnight besides black tea,  black coffee, or water. If you do not hear from this office within two days of having your labs done, please call for your results.     HYPERLIPIDEMIA:  Decrease intake of saturated fats, fast food, sweets.  Increase intake of fresh fruit fresh vegetables and lean meats.  Increase healthy fats seeds, nuts, olive oil instead of butter.  walk 150 minutes/week for heart health.

## 2024-06-07 NOTE — PROGRESS NOTES
Subjective   Patient ID: Desiree Green is a 83 y.o. female who presents for annual physical   HPI accompanied by daughter  Patient denies any falls, urgent care, ER, hospitalization, new diagnoses, surgeries in the past year.  Denies any issues with chest pain, chest pressure, shortness of breath, constipation, blood in stool, urinary urgency, frequency, blood in urine, muscle weakness in arms and legs, numbness or tingling in fingers or toes.  Chronic diarrhea manages with lifestyle mod.   She will need a refill on simvastatin today  Review of Systems  Review of Systems negative except as noted in HPI and Chief complaint.    Current Outpatient Medications:     albuterol 90 mcg/actuation inhaler, Inhale 1 puff every 4 hours if needed., Disp: , Rfl:     Breo Ellipta 200-25 mcg/dose inhaler, Inhale 1 puff once daily., Disp: , Rfl:     gabapentin (Neurontin) 300 mg capsule, Take 1 capsule (300 mg) by mouth 2 times a day., Disp: , Rfl:     meclizine (Antivert) 25 mg tablet, TAKE 1 TABLET BY MOUTH ONCE  DAILY AS NEEDED FOR DIZZINESS, Disp: 90 tablet, Rfl: 1    metoprolol tartrate (Lopressor) 25 mg tablet, Take 0.5 tablets (12.5 mg) by mouth 2 times a day., Disp: , Rfl:     nortriptyline (Pamelor) 25 mg capsule, Take 1 capsule (25 mg) by mouth once daily at bedtime., Disp: , Rfl:     trifluoperazine (Stelazine) 5 mg tablet, once every 24 hours. TAKE 1 TABLET M/W/F/SAT AND 2 TABLETS T/TR/SUN, Disp: , Rfl:     warfarin (Coumadin) 3 mg tablet, TAKE 1 TABLET BY MOUTH 4 TIMES  WEEKLY TUESDAY FRIDAY SATURDAY SUNDAY, Disp: 48 tablet, Rfl: 3    warfarin (Coumadin) 6 mg tablet, TAKE 1 TABLET BY MOUTH 3 TIMES  WEEKLY ON MONDAY, WEDNESDAY,  THURSDAY, Disp: 36 tablet, Rfl: 3    simvastatin (Zocor) 40 mg tablet, Take 1 tablet (40 mg) by mouth once daily., Disp: 30 tablet, Rfl: 0    Objective   /88   Pulse 100   Wt 91 kg (200 lb 9.6 oz)   SpO2 99%   BMI 35.53 kg/m²     Physical Exam  Vitals reviewed.   Cardiovascular:       Rate and Rhythm: Normal rate.   Pulmonary:      Effort: Pulmonary effort is normal.   Musculoskeletal:         General: Normal range of motion.      Cervical back: Normal range of motion.   Neurological:      General: No focal deficit present.      Mental Status: She is alert.   Psychiatric:         Mood and Affect: Mood normal.       Walks with Walker   Assessment/Plan   Diagnoses and all orders for this visit:  Medicare annual wellness visit, subsequent  -     Comprehensive Metabolic Panel; Future  -     Hemoglobin A1c; Future  Mixed hyperlipidemia  -     simvastatin (Zocor) 40 mg tablet; Take 1 tablet (40 mg) by mouth once daily.  -     CBC and Auto Differential; Future  -     Lipid Panel; Future  Paroxysmal atrial fibrillation (Multi)  -     Disability Placard  Vitamin D deficiency  -     Vitamin D 25-Hydroxy,Total (for eval of Vitamin D levels); Future  Stage 3 chronic kidney disease, unspecified whether stage 3a or 3b CKD (Multi)  -     Disability Placard    LAB Order/ BLOOD TESTS   I have ordered lab work for you to get done. This should be fasting. Nothing to eat or drink after midnight besides black tea,  black coffee, or water. If you do not hear from this office within two days of having your labs done, please call for your results.     HYPERLIPIDEMIA:  Decrease intake of saturated fats, fast food, sweets.  Increase intake of fresh fruit fresh vegetables and lean meats.  Increase healthy fats seeds, nuts, olive oil instead of butter.  walk 150 minutes/week for heart health.  *This note was dictated using DRAGON speech recognition software and was corrected for spelling or grammatical errors, but despite proofreading several typographical errors might be present that might affect the meaning of the content.*  Vianey Phelps CNP  Advanced care planning was discussed with Desiree Green today. We reviewed code status, Medical Power of , and Living will. Pt has LW or POA.   Vianey Phelps,  CNP    Time Spent  Prep time on day of patient encounter: 5 minutes  Time spent directly with patient, family or caregiver: 20 minutes  Documentation Time: 5 minutes

## 2024-06-13 ENCOUNTER — TELEPHONE (OUTPATIENT)
Dept: PRIMARY CARE | Facility: CLINIC | Age: 84
End: 2024-06-13
Payer: MEDICARE

## 2024-06-13 ENCOUNTER — PATIENT OUTREACH (OUTPATIENT)
Dept: PRIMARY CARE | Facility: CLINIC | Age: 84
End: 2024-06-13
Payer: MEDICARE

## 2024-06-13 NOTE — PROGRESS NOTES
Successful outreach to patient regarding hospitalization as patient continues TCM program.   At time of outreach call the patient feels as if their condition has (improved) since initial visit with PCP or specialist.  Questions or concerns addressed at this time with patient.   Provided contact information to patient if any further non-emergent needs arise.     Benoit Martinez LPN

## 2024-06-13 NOTE — TELEPHONE ENCOUNTER
Stacy called from [facility]. Patient's INR is 2.7. Current dosage is 6 mg M, W, Thu, 3 mg Tue, Fri, Sat, Sun. She held one dosage of 3 mg last week. Please call Stacy [phone] and patient's  with any dosage changes.

## 2024-06-20 ENCOUNTER — TELEPHONE (OUTPATIENT)
Dept: PRIMARY CARE | Facility: CLINIC | Age: 84
End: 2024-06-20
Payer: MEDICARE

## 2024-06-20 NOTE — TELEPHONE ENCOUNTER
Stacy from [facility] called. Patient's INR today is 2.4. Current dosage is 6 mg M, W, Thu, 3 mg Tue, Fri, Sat, Sun. She held one dosage of 3 mg last week as directed. Please call Stacy at [phone] with dosage update as well as the .

## 2024-06-27 ENCOUNTER — ANTICOAGULATION - WARFARIN VISIT (OUTPATIENT)
Dept: PRIMARY CARE | Facility: CLINIC | Age: 84
End: 2024-06-27
Payer: MEDICARE

## 2024-06-27 ENCOUNTER — TELEPHONE (OUTPATIENT)
Dept: PRIMARY CARE | Facility: CLINIC | Age: 84
End: 2024-06-27

## 2024-06-27 LAB
INR IN PPP BY COAGULATION ASSAY EXTERNAL: 2.3 (ref 2–3)
PROTHROMBIN TIME (PT) IN PPP BY COAGULATION ASSAY EXTERNAL: NORMAL SECONDS

## 2024-06-27 PROCEDURE — 99212 OFFICE O/P EST SF 10 MIN: CPT

## 2024-07-09 ENCOUNTER — ANTICOAGULATION - WARFARIN VISIT (OUTPATIENT)
Dept: PRIMARY CARE | Facility: CLINIC | Age: 84
End: 2024-07-09
Payer: MEDICARE

## 2024-07-10 DIAGNOSIS — E78.2 MIXED HYPERLIPIDEMIA: ICD-10-CM

## 2024-07-10 RX ORDER — SIMVASTATIN 40 MG/1
40 TABLET, FILM COATED ORAL NIGHTLY
Qty: 90 TABLET | Refills: 1 | Status: SHIPPED | OUTPATIENT
Start: 2024-07-10 | End: 2025-01-06

## 2024-07-10 RX ORDER — SIMVASTATIN 40 MG/1
40 TABLET, FILM COATED ORAL DAILY
Qty: 30 TABLET | Refills: 0 | Status: SHIPPED | OUTPATIENT
Start: 2024-07-10 | End: 2024-08-09

## 2024-07-10 NOTE — TELEPHONE ENCOUNTER
Patient needs a 30 day supply of simvastatin sent to local pharmacy [facility], they will be out in 2-3 days. And a 90 day supply sent to [facility]. It looks like the one that was sent to [facility] on 06/07/24 was discontinued and then just sent to [facility] for 30 day supply but they still need the 90 day Rx to go to [facility]. See Rx tab for the 30 day supply. I can't separate them but if you need me to send a separate TE for 90 day supply let me know.

## 2024-07-11 ENCOUNTER — ANTICOAGULATION - WARFARIN VISIT (OUTPATIENT)
Dept: PRIMARY CARE | Facility: CLINIC | Age: 84
End: 2024-07-11
Payer: MEDICARE

## 2024-07-11 ENCOUNTER — ANTICOAGULATION - WARFARIN VISIT (OUTPATIENT)
Dept: PRIMARY CARE | Facility: CLINIC | Age: 84
End: 2024-07-11

## 2024-07-11 ENCOUNTER — TELEPHONE (OUTPATIENT)
Dept: PRIMARY CARE | Facility: CLINIC | Age: 84
End: 2024-07-11

## 2024-07-11 LAB
INR IN PPP BY COAGULATION ASSAY EXTERNAL: 2 (ref 2–3)
INR IN PPP BY COAGULATION ASSAY EXTERNAL: 2.3 (ref 2–3)
PROTHROMBIN TIME (PT) IN PPP BY COAGULATION ASSAY EXTERNAL: NORMAL
PROTHROMBIN TIME (PT) IN PPP BY COAGULATION ASSAY EXTERNAL: NORMAL

## 2024-07-11 PROCEDURE — 99212 OFFICE O/P EST SF 10 MIN: CPT

## 2024-07-11 NOTE — TELEPHONE ENCOUNTER
Patient's INR today is 2.3. Current dosage is 6 mg Mon, Wed, Thur and 3 mg Tu, Fri, Sat, Sun. Patient's  notified to continue current dosage and repeat in 1 week.

## 2024-07-18 ENCOUNTER — ANTICOAGULATION - WARFARIN VISIT (OUTPATIENT)
Dept: PRIMARY CARE | Facility: CLINIC | Age: 84
End: 2024-07-18
Payer: MEDICARE

## 2024-07-18 ENCOUNTER — TELEPHONE (OUTPATIENT)
Dept: PRIMARY CARE | Facility: CLINIC | Age: 84
End: 2024-07-18

## 2024-07-18 LAB
INR IN PPP BY COAGULATION ASSAY EXTERNAL: 2.2 (ref 2–3)
PROTHROMBIN TIME (PT) IN PPP BY COAGULATION ASSAY EXTERNAL: NORMAL

## 2024-07-18 PROCEDURE — 99212 OFFICE O/P EST SF 10 MIN: CPT

## 2024-07-25 LAB
INR IN PPP BY COAGULATION ASSAY EXTERNAL: 2.5 (ref 2–3)
PROTHROMBIN TIME (PT) IN PPP BY COAGULATION ASSAY EXTERNAL: NORMAL

## 2024-07-26 ENCOUNTER — ANTICOAGULATION - WARFARIN VISIT (OUTPATIENT)
Dept: PRIMARY CARE | Facility: CLINIC | Age: 84
End: 2024-07-26
Payer: MEDICARE

## 2024-07-26 ENCOUNTER — TELEPHONE (OUTPATIENT)
Dept: PRIMARY CARE | Facility: CLINIC | Age: 84
End: 2024-07-26

## 2024-08-08 LAB
INR IN PPP BY COAGULATION ASSAY EXTERNAL: 2 (ref 2–3)
PROTHROMBIN TIME (PT) IN PPP BY COAGULATION ASSAY EXTERNAL: NORMAL

## 2024-08-09 ENCOUNTER — TELEPHONE (OUTPATIENT)
Dept: PRIMARY CARE | Facility: CLINIC | Age: 84
End: 2024-08-09

## 2024-08-09 ENCOUNTER — ANTICOAGULATION - WARFARIN VISIT (OUTPATIENT)
Dept: PRIMARY CARE | Facility: CLINIC | Age: 84
End: 2024-08-09
Payer: MEDICARE

## 2024-08-09 PROCEDURE — 99212 OFFICE O/P EST SF 10 MIN: CPT

## 2024-08-12 ENCOUNTER — PATIENT OUTREACH (OUTPATIENT)
Dept: PRIMARY CARE | Facility: CLINIC | Age: 84
End: 2024-08-12
Payer: MEDICARE

## 2024-08-12 NOTE — PROGRESS NOTES
Patient has met target of no readmission for (90) days post (hospital, SNF, rehab) discharge and is graduated from Transitional Care Management program at this time.    Benoit Martinez LPN

## 2024-08-15 ENCOUNTER — ANTICOAGULATION - WARFARIN VISIT (OUTPATIENT)
Dept: PRIMARY CARE | Facility: CLINIC | Age: 84
End: 2024-08-15
Payer: MEDICARE

## 2024-08-15 LAB
INR IN PPP BY COAGULATION ASSAY EXTERNAL: 2.1 (ref 2–3)
PROTHROMBIN TIME (PT) IN PPP BY COAGULATION ASSAY EXTERNAL: NORMAL

## 2024-08-15 PROCEDURE — 99212 OFFICE O/P EST SF 10 MIN: CPT

## 2024-08-15 RX ORDER — GABAPENTIN 300 MG/1
300 CAPSULE ORAL 2 TIMES DAILY
Qty: 180 CAPSULE | Refills: 3 | OUTPATIENT
Start: 2024-08-15 | End: 2025-08-15

## 2024-08-15 NOTE — TELEPHONE ENCOUNTER
Patient's INR today is 2.1. Currrent dosage is 6 mg M/W/Th 3 mg all other days. Continue current dosage. Repeat in 1 week. Patient's  aware.

## 2024-08-20 DIAGNOSIS — I48.0 PAROXYSMAL ATRIAL FIBRILLATION (MULTI): ICD-10-CM

## 2024-08-20 RX ORDER — WARFARIN 6 MG/1
TABLET ORAL
Qty: 39 TABLET | Refills: 3 | Status: SHIPPED | OUTPATIENT
Start: 2024-08-20

## 2024-08-22 LAB
INR IN PPP BY COAGULATION ASSAY EXTERNAL: 2.6 (ref 2–3)
PROTHROMBIN TIME (PT) IN PPP BY COAGULATION ASSAY EXTERNAL: NORMAL

## 2024-08-23 ENCOUNTER — ANTICOAGULATION - WARFARIN VISIT (OUTPATIENT)
Dept: PRIMARY CARE | Facility: CLINIC | Age: 84
End: 2024-08-23
Payer: MEDICARE

## 2024-08-23 ENCOUNTER — TELEPHONE (OUTPATIENT)
Dept: PRIMARY CARE | Facility: CLINIC | Age: 84
End: 2024-08-23

## 2024-08-23 PROCEDURE — 99212 OFFICE O/P EST SF 10 MIN: CPT

## 2024-08-29 LAB
INR IN PPP BY COAGULATION ASSAY EXTERNAL: 2.8 (ref 2–3)
PROTHROMBIN TIME (PT) IN PPP BY COAGULATION ASSAY EXTERNAL: NORMAL

## 2024-08-30 ENCOUNTER — ANTICOAGULATION - WARFARIN VISIT (OUTPATIENT)
Dept: PRIMARY CARE | Facility: CLINIC | Age: 84
End: 2024-08-30
Payer: MEDICARE

## 2024-08-30 ENCOUNTER — TELEPHONE (OUTPATIENT)
Dept: PRIMARY CARE | Facility: CLINIC | Age: 84
End: 2024-08-30

## 2024-08-30 PROCEDURE — 99212 OFFICE O/P EST SF 10 MIN: CPT

## 2024-09-04 DIAGNOSIS — G62.9 NEUROPATHY: Primary | ICD-10-CM

## 2024-09-04 DIAGNOSIS — I48.0 PAROXYSMAL ATRIAL FIBRILLATION (MULTI): ICD-10-CM

## 2024-09-04 RX ORDER — GABAPENTIN 300 MG/1
300 CAPSULE ORAL 2 TIMES DAILY
Qty: 180 CAPSULE | Refills: 1 | Status: SHIPPED | OUTPATIENT
Start: 2024-09-04 | End: 2025-03-03

## 2024-09-04 RX ORDER — WARFARIN 3 MG/1
TABLET ORAL
Qty: 90 TABLET | Refills: 1 | Status: SHIPPED | OUTPATIENT
Start: 2024-09-04 | End: 2024-09-06

## 2024-09-04 NOTE — TELEPHONE ENCOUNTER
Patient's  called and he has been giving patient 6 mg on Tue, and Fri and the rest of the days are 3 mg. He has been giving her this dose for months now. I let him know that it is very very important that we know what dosage she is ACTUALLY taking because we have been going off of her taking 6 mg M, W, Fri and 3 mg all other days. He needs a refill of the 3 mg and I will update her dosage once the INR result comes in tomorrow.

## 2024-09-05 LAB
INR IN PPP BY COAGULATION ASSAY EXTERNAL: 3.6 (ref 2–3)
PROTHROMBIN TIME (PT) IN PPP BY COAGULATION ASSAY EXTERNAL: ABNORMAL

## 2024-09-06 ENCOUNTER — ANTICOAGULATION - WARFARIN VISIT (OUTPATIENT)
Dept: PRIMARY CARE | Facility: CLINIC | Age: 84
End: 2024-09-06
Payer: MEDICARE

## 2024-09-06 ENCOUNTER — TELEPHONE (OUTPATIENT)
Dept: PRIMARY CARE | Facility: CLINIC | Age: 84
End: 2024-09-06

## 2024-09-06 DIAGNOSIS — I48.0 PAROXYSMAL ATRIAL FIBRILLATION (MULTI): ICD-10-CM

## 2024-09-06 PROCEDURE — 99212 OFFICE O/P EST SF 10 MIN: CPT

## 2024-09-06 RX ORDER — WARFARIN 3 MG/1
TABLET ORAL
Qty: 90 TABLET | Refills: 1 | Status: SHIPPED | OUTPATIENT
Start: 2024-09-06

## 2024-09-13 ENCOUNTER — TELEPHONE (OUTPATIENT)
Dept: PRIMARY CARE | Facility: CLINIC | Age: 84
End: 2024-09-13

## 2024-09-13 ENCOUNTER — ANTICOAGULATION - WARFARIN VISIT (OUTPATIENT)
Dept: PRIMARY CARE | Facility: CLINIC | Age: 84
End: 2024-09-13
Payer: MEDICARE

## 2024-09-13 PROCEDURE — 99212 OFFICE O/P EST SF 10 MIN: CPT

## 2024-09-13 NOTE — TELEPHONE ENCOUNTER
Patient's INR is 2.6. Current dosage is 6 mg Tue/Fri and 3 mg all other days, she stopped one 6 mg dose last week.

## 2024-09-19 LAB
INR IN PPP BY COAGULATION ASSAY EXTERNAL: 3.2 (ref 2–3)
PROTHROMBIN TIME (PT) IN PPP BY COAGULATION ASSAY EXTERNAL: ABNORMAL

## 2024-09-20 ENCOUNTER — ANTICOAGULATION - WARFARIN VISIT (OUTPATIENT)
Dept: PRIMARY CARE | Facility: CLINIC | Age: 84
End: 2024-09-20
Payer: MEDICARE

## 2024-09-20 ENCOUNTER — TELEPHONE (OUTPATIENT)
Dept: PRIMARY CARE | Facility: CLINIC | Age: 84
End: 2024-09-20

## 2024-09-20 PROCEDURE — 99212 OFFICE O/P EST SF 10 MIN: CPT

## 2024-09-20 NOTE — TELEPHONE ENCOUNTER
INR 3.2 Current dose 6mg Tuesday and Friday. 3 mg all other days. Continue current dose. Recheck INR in 1 week.

## 2024-09-26 ENCOUNTER — TELEPHONE (OUTPATIENT)
Dept: PRIMARY CARE | Facility: CLINIC | Age: 84
End: 2024-09-26
Payer: MEDICARE

## 2024-09-26 ENCOUNTER — ANTICOAGULATION - WARFARIN VISIT (OUTPATIENT)
Dept: PRIMARY CARE | Facility: CLINIC | Age: 84
End: 2024-09-26
Payer: MEDICARE

## 2024-10-03 ENCOUNTER — TELEPHONE (OUTPATIENT)
Dept: PRIMARY CARE | Facility: CLINIC | Age: 84
End: 2024-10-03
Payer: MEDICARE

## 2024-10-03 ENCOUNTER — ANTICOAGULATION - WARFARIN VISIT (OUTPATIENT)
Dept: PRIMARY CARE | Facility: CLINIC | Age: 84
End: 2024-10-03
Payer: MEDICARE

## 2024-10-03 LAB
INR IN PPP BY COAGULATION ASSAY EXTERNAL: 3.4 (ref 2–3)
PROTHROMBIN TIME (PT) IN PPP BY COAGULATION ASSAY EXTERNAL: ABNORMAL

## 2024-10-10 LAB
INR IN PPP BY COAGULATION ASSAY EXTERNAL: 3.3 (ref 2–3)
PROTHROMBIN TIME (PT) IN PPP BY COAGULATION ASSAY EXTERNAL: ABNORMAL

## 2024-10-11 ENCOUNTER — APPOINTMENT (OUTPATIENT)
Dept: NEPHROLOGY | Facility: CLINIC | Age: 84
End: 2024-10-11
Payer: MEDICARE

## 2024-10-11 ENCOUNTER — TELEPHONE (OUTPATIENT)
Dept: PRIMARY CARE | Facility: CLINIC | Age: 84
End: 2024-10-11

## 2024-10-11 ENCOUNTER — ANTICOAGULATION - WARFARIN VISIT (OUTPATIENT)
Dept: PRIMARY CARE | Facility: CLINIC | Age: 84
End: 2024-10-11

## 2024-10-15 ENCOUNTER — OFFICE VISIT (OUTPATIENT)
Dept: PRIMARY CARE | Facility: CLINIC | Age: 84
End: 2024-10-15
Payer: MEDICARE

## 2024-10-15 VITALS
DIASTOLIC BLOOD PRESSURE: 77 MMHG | BODY MASS INDEX: 33.87 KG/M2 | HEIGHT: 64 IN | RESPIRATION RATE: 16 BRPM | HEART RATE: 94 BPM | OXYGEN SATURATION: 97 % | WEIGHT: 198.4 LBS | SYSTOLIC BLOOD PRESSURE: 123 MMHG

## 2024-10-15 DIAGNOSIS — I48.0 PAROXYSMAL ATRIAL FIBRILLATION (MULTI): ICD-10-CM

## 2024-10-15 DIAGNOSIS — Z23 ENCOUNTER FOR IMMUNIZATION: Primary | ICD-10-CM

## 2024-10-15 DIAGNOSIS — E78.2 MIXED HYPERLIPIDEMIA: ICD-10-CM

## 2024-10-15 DIAGNOSIS — R25.1 TREMOR OF RIGHT HAND: ICD-10-CM

## 2024-10-15 DIAGNOSIS — R53.1 WEAKNESS: ICD-10-CM

## 2024-10-15 PROCEDURE — G0008 ADMIN INFLUENZA VIRUS VAC: HCPCS

## 2024-10-15 PROCEDURE — 1157F ADVNC CARE PLAN IN RCRD: CPT

## 2024-10-15 PROCEDURE — 1036F TOBACCO NON-USER: CPT

## 2024-10-15 PROCEDURE — 1124F ACP DISCUSS-NO DSCNMKR DOCD: CPT

## 2024-10-15 PROCEDURE — 99214 OFFICE O/P EST MOD 30 MIN: CPT

## 2024-10-15 PROCEDURE — 1159F MED LIST DOCD IN RCRD: CPT

## 2024-10-15 PROCEDURE — 1125F AMNT PAIN NOTED PAIN PRSNT: CPT

## 2024-10-15 ASSESSMENT — PATIENT HEALTH QUESTIONNAIRE - PHQ9
SUM OF ALL RESPONSES TO PHQ9 QUESTIONS 1 AND 2: 0
1. LITTLE INTEREST OR PLEASURE IN DOING THINGS: NOT AT ALL
2. FEELING DOWN, DEPRESSED OR HOPELESS: NOT AT ALL

## 2024-10-15 ASSESSMENT — ENCOUNTER SYMPTOMS
OCCASIONAL FEELINGS OF UNSTEADINESS: 0
LOSS OF SENSATION IN FEET: 0
DEPRESSION: 0

## 2024-10-15 ASSESSMENT — COLUMBIA-SUICIDE SEVERITY RATING SCALE - C-SSRS
6. HAVE YOU EVER DONE ANYTHING, STARTED TO DO ANYTHING, OR PREPARED TO DO ANYTHING TO END YOUR LIFE?: NO
1. IN THE PAST MONTH, HAVE YOU WISHED YOU WERE DEAD OR WISHED YOU COULD GO TO SLEEP AND NOT WAKE UP?: NO
2. HAVE YOU ACTUALLY HAD ANY THOUGHTS OF KILLING YOURSELF?: NO

## 2024-10-15 ASSESSMENT — PAIN SCALES - GENERAL: PAINLEVEL: 5

## 2024-10-15 NOTE — PATIENT INSTRUCTIONS
Assessment/Plan   Diagnoses and all orders for this visit:  Encounter for immunization  -     Flu vaccine, trivalent, preservative free, HIGH-DOSE, age 65y+ (Fluzone)  Mixed hyperlipidemia  Decrease intake of saturated fats, fast food, sweets.  Increase intake of fresh fruit fresh vegetables and lean meats.  Increase healthy fats seeds, nuts, olive oil instead of butter.    Paroxysmal atrial fibrillation (Multi)  Continue weekly INR checks. Call this office with readings to determine dose changes  Print off given with foods to avoid while on coumadin.   Discussed stop drinking green tea as this may fluctuate the iNR  Tremor of right hand  -     Referral to Neurology; Future  Referral placed to neuro for further management.   Weakness  -     Referral to Neurology; Future  Referral placed to neuro for further management.

## 2024-10-15 NOTE — PROGRESS NOTES
"Subjective   Patient ID: Desiree Green is a 84 y.o. female who presents for Follow-up.    HPI   Accompanied by her .   AFIB- On Coumadin weekly INR. Managed by me. Admits to drinking increased amounts of green tea daily.  had concerns about the fluctuation of her INR.   also made a point to apologize for him being upset in previous encounter. He noted, as he was teary eyed, he was just concerned about his wife.    Tremor- right hand this has been on going. She has not seen neurology for work up.   Weakness admits to increasing weakness. She did have PT in the past. Noted improvement, but does not want therapy at this time. Notes she has the list of exercises to do, but does not do them. Per the  she only does them if he makes her and he and their daughter do the exercises with her.- which is not often.   Diarrhea that has been ongoing that she \"just manages\"   SOB with exertion  subsides once seated. This has been ongoing.   Review of Systems  Review of Systems negative except as noted in HPI and Chief complaint.    Current Outpatient Medications:     Breo Ellipta 200-25 mcg/dose inhaler, Inhale 1 puff once daily., Disp: , Rfl:     gabapentin (Neurontin) 300 mg capsule, Take 1 capsule (300 mg) by mouth 2 times a day., Disp: 180 capsule, Rfl: 1    meclizine (Antivert) 25 mg tablet, TAKE 1 TABLET BY MOUTH ONCE  DAILY AS NEEDED FOR DIZZINESS, Disp: 90 tablet, Rfl: 1    nortriptyline (Pamelor) 25 mg capsule, Take 1 capsule (25 mg) by mouth once daily at bedtime., Disp: , Rfl:     simvastatin (Zocor) 40 mg tablet, Take 1 tablet (40 mg) by mouth once daily at bedtime., Disp: 90 tablet, Rfl: 1    trifluoperazine (Stelazine) 5 mg tablet, once every 24 hours. TAKE 1 TABLET M/W/F/SAT AND 2 TABLETS T/TR/SUN, Disp: , Rfl:     warfarin (Coumadin) 3 mg tablet, Take all days except Tuesday and Friday.TAKE 1 TABLET 3 mg BY MOUTH 5 TIMES  WEEKLY. Take Monday, Wednesday, Thursday, Saturday and Sunday., " "Disp: 90 tablet, Rfl: 1    simvastatin (Zocor) 40 mg tablet, Take 1 tablet (40 mg) by mouth once daily., Disp: 30 tablet, Rfl: 0    Objective   /77 (BP Location: Left arm, Patient Position: Sitting, BP Cuff Size: Adult)   Pulse 94   Resp 16   Ht 1.626 m (5' 4\")   Wt 90 kg (198 lb 6.4 oz)   SpO2 97%   BMI 34.06 kg/m²     Physical Exam  Vitals reviewed.   Cardiovascular:      Rate and Rhythm: Normal rate. Rhythm irregular.   Pulmonary:      Effort: Pulmonary effort is normal.   Musculoskeletal:         General: Normal range of motion.      Cervical back: Normal range of motion.   Neurological:      General: No focal deficit present.      Mental Status: She is alert.   Psychiatric:         Mood and Affect: Mood normal.     Walks with walker   Assessment/Plan   Diagnoses and all orders for this visit:  Encounter for immunization  -     Flu vaccine, trivalent, preservative free, HIGH-DOSE, age 65y+ (Fluzone)  Mixed hyperlipidemia  Decrease intake of saturated fats, fast food, sweets.  Increase intake of fresh fruit fresh vegetables and lean meats.  Increase healthy fats seeds, nuts, olive oil instead of butter.    Paroxysmal atrial fibrillation (Multi)  Continue weekly INR checks. Call this office with readings to determine dose changes  Print off given with foods to avoid while on coumadin.   Discussed stop drinking green tea as this may fluctuate the iNR  Tremor of right hand  -     Referral to Neurology; Future  Referral placed to neuro for further management.   Weakness  -     Referral to Neurology; Future  Referral placed to neuro for further management.  This note was dictated using DRAGON speech recognition software and was corrected for spelling or grammatical errors, but despite proofreading several typographical errors might be present that might affect the meaning of the content.  Vianey Phelps CNP,  Advanced care planning was discussed with Desiree Green today. We reviewed code status, " Medical Power of , and Living will. Pt has LW or POA.

## 2024-10-17 ENCOUNTER — ANTICOAGULATION - WARFARIN VISIT (OUTPATIENT)
Dept: PRIMARY CARE | Facility: CLINIC | Age: 84
End: 2024-10-17
Payer: MEDICARE

## 2024-10-17 ENCOUNTER — TELEPHONE (OUTPATIENT)
Dept: PRIMARY CARE | Facility: CLINIC | Age: 84
End: 2024-10-17
Payer: MEDICARE

## 2024-10-17 LAB
INR IN PPP BY COAGULATION ASSAY EXTERNAL: 3.9 (ref 2–3)
PROTHROMBIN TIME (PT) IN PPP BY COAGULATION ASSAY EXTERNAL: ABNORMAL

## 2024-10-17 NOTE — TELEPHONE ENCOUNTER
Notified by daughter that INR is 3.9. Current dosage is 6 mg 3 days a week and 3 mg all other days.

## 2024-10-24 ENCOUNTER — ANTICOAGULATION - WARFARIN VISIT (OUTPATIENT)
Dept: PRIMARY CARE | Facility: CLINIC | Age: 84
End: 2024-10-24
Payer: MEDICARE

## 2024-10-24 ENCOUNTER — TELEPHONE (OUTPATIENT)
Dept: PRIMARY CARE | Facility: CLINIC | Age: 84
End: 2024-10-24
Payer: MEDICARE

## 2024-10-24 LAB
INR IN PPP BY COAGULATION ASSAY EXTERNAL: 2.9 (ref 2–3)
PROTHROMBIN TIME (PT) IN PPP BY COAGULATION ASSAY EXTERNAL: NORMAL

## 2024-10-24 NOTE — TELEPHONE ENCOUNTER
Patient's INR is 2.9. She held one 3 mg dose last week and continued 6 mg 3 days and 3 mg all other days.

## 2024-10-29 ENCOUNTER — TELEPHONE (OUTPATIENT)
Dept: PRIMARY CARE | Facility: CLINIC | Age: 84
End: 2024-10-29
Payer: MEDICARE

## 2024-11-01 ENCOUNTER — TELEPHONE (OUTPATIENT)
Dept: PRIMARY CARE | Facility: CLINIC | Age: 84
End: 2024-11-01
Payer: MEDICARE

## 2024-11-01 ENCOUNTER — ANTICOAGULATION - WARFARIN VISIT (OUTPATIENT)
Dept: PRIMARY CARE | Facility: CLINIC | Age: 84
End: 2024-11-01
Payer: MEDICARE

## 2024-11-01 LAB
INR IN PPP BY COAGULATION ASSAY EXTERNAL: 3.1 (ref 2–3)
PROTHROMBIN TIME (PT) IN PPP BY COAGULATION ASSAY EXTERNAL: ABNORMAL

## 2024-11-01 NOTE — TELEPHONE ENCOUNTER
Patient's INR Is 3.1. Held one dosage of 3 mg then continued 6 mg Mon, Wed, Thu, 3 mg all other days.

## 2024-11-04 ENCOUNTER — TELEPHONE (OUTPATIENT)
Dept: PRIMARY CARE | Facility: CLINIC | Age: 84
End: 2024-11-04
Payer: MEDICARE

## 2024-11-04 NOTE — TELEPHONE ENCOUNTER
See TE from 11/01/24, spoke with patient's  and message given. Closed TE out before I could document.

## 2024-11-07 ENCOUNTER — TELEPHONE (OUTPATIENT)
Dept: PRIMARY CARE | Facility: CLINIC | Age: 84
End: 2024-11-07
Payer: MEDICARE

## 2024-11-07 ENCOUNTER — ANTICOAGULATION - WARFARIN VISIT (OUTPATIENT)
Dept: PRIMARY CARE | Facility: CLINIC | Age: 84
End: 2024-11-07
Payer: MEDICARE

## 2024-11-07 LAB
INR IN PPP BY COAGULATION ASSAY EXTERNAL: 1.6 (ref 2–3)
PROTHROMBIN TIME (PT) IN PPP BY COAGULATION ASSAY EXTERNAL: ABNORMAL

## 2024-11-07 NOTE — TELEPHONE ENCOUNTER
Patient's current INR is 1.6. Held one dose of 3 mg last week then continued current dosage which is 6 mg 3 days a week and 3 mg all other days.

## 2024-11-08 ENCOUNTER — TELEPHONE (OUTPATIENT)
Dept: PRIMARY CARE | Facility: CLINIC | Age: 84
End: 2024-11-08
Payer: MEDICARE

## 2024-11-08 DIAGNOSIS — I48.0 PAROXYSMAL ATRIAL FIBRILLATION (MULTI): Primary | ICD-10-CM

## 2024-11-08 NOTE — TELEPHONE ENCOUNTER
Spoke with pt  in regards to wife INR.  stated that he don't think that her INR was correct at 3.1. He said that his wife woke up later than normal and when he looked at her pills he realized that the 3 mg dosage was not taken. Had her take the 3 mg dose and checked her INR and it may not absorbed in her system and that's why it was at 3.1 pt was verbally advised and wrote down direct directions for next INR and said he got it

## 2024-11-11 ENCOUNTER — TELEPHONE (OUTPATIENT)
Dept: CARDIOLOGY | Facility: HOSPITAL | Age: 84
End: 2024-11-11
Payer: MEDICARE

## 2024-11-11 NOTE — TELEPHONE ENCOUNTER
Left message to call and schedule New patient visit. Per chart review, patient has been on coumadin previously managed by PCP, so will be new to clinic

## 2024-11-15 ENCOUNTER — ANTICOAGULATION - WARFARIN VISIT (OUTPATIENT)
Dept: PRIMARY CARE | Facility: CLINIC | Age: 84
End: 2024-11-15
Payer: MEDICARE

## 2024-11-15 ENCOUNTER — TELEPHONE (OUTPATIENT)
Dept: PRIMARY CARE | Facility: CLINIC | Age: 84
End: 2024-11-15
Payer: MEDICARE

## 2024-11-15 DIAGNOSIS — I48.0 PAROXYSMAL ATRIAL FIBRILLATION (MULTI): ICD-10-CM

## 2024-11-20 ENCOUNTER — ANTICOAGULATION - WARFARIN VISIT (OUTPATIENT)
Dept: CARDIOLOGY | Facility: HOSPITAL | Age: 84
End: 2024-11-20
Payer: MEDICARE

## 2024-11-20 DIAGNOSIS — I48.0 PAROXYSMAL ATRIAL FIBRILLATION (MULTI): ICD-10-CM

## 2024-11-20 NOTE — PROGRESS NOTES
Spoke with  who prefers staying with home monitoring at this time. Will arrange for results to come to this clinic and dosing protocols review as also reviewed current dosing regime. They will test tomorrow and call clinic with results

## 2024-11-21 ENCOUNTER — APPOINTMENT (OUTPATIENT)
Dept: CARDIOLOGY | Facility: HOSPITAL | Age: 84
End: 2024-11-21
Payer: MEDICARE

## 2024-11-22 ENCOUNTER — TELEPHONE (OUTPATIENT)
Dept: PRIMARY CARE | Facility: CLINIC | Age: 84
End: 2024-11-22
Payer: MEDICARE

## 2024-11-22 ENCOUNTER — ANTICOAGULATION - WARFARIN VISIT (OUTPATIENT)
Dept: PRIMARY CARE | Facility: CLINIC | Age: 84
End: 2024-11-22
Payer: MEDICARE

## 2024-11-22 DIAGNOSIS — I48.0 PAROXYSMAL ATRIAL FIBRILLATION (MULTI): ICD-10-CM

## 2024-11-22 NOTE — TELEPHONE ENCOUNTER
Patient's current INR is 3.1. Current dosage is 6 mg every Mon, Wed, Thur and 3 mg all other days. Please advise. Thank you.

## 2024-11-26 ENCOUNTER — TELEPHONE (OUTPATIENT)
Dept: PRIMARY CARE | Facility: CLINIC | Age: 84
End: 2024-11-26
Payer: MEDICARE

## 2024-11-26 NOTE — TELEPHONE ENCOUNTER
Called pt , Left detailed message in regards to INR results. Advised to continue current dose and repeat in 1 week. If any questions or concerns to call office

## 2024-11-28 DIAGNOSIS — E78.2 MIXED HYPERLIPIDEMIA: ICD-10-CM

## 2024-11-29 RX ORDER — SIMVASTATIN 40 MG/1
40 TABLET, FILM COATED ORAL NIGHTLY
Qty: 90 TABLET | Refills: 1 | Status: SHIPPED | OUTPATIENT
Start: 2024-11-29

## 2024-12-03 ENCOUNTER — TELEPHONE (OUTPATIENT)
Dept: PRIMARY CARE | Facility: CLINIC | Age: 84
End: 2024-12-03
Payer: MEDICARE

## 2024-12-03 ENCOUNTER — ANTICOAGULATION - WARFARIN VISIT (OUTPATIENT)
Dept: PRIMARY CARE | Facility: CLINIC | Age: 84
End: 2024-12-03
Payer: MEDICARE

## 2024-12-03 DIAGNOSIS — R42 DIZZINESS: ICD-10-CM

## 2024-12-03 RX ORDER — MECLIZINE HYDROCHLORIDE 25 MG/1
TABLET ORAL
Qty: 90 TABLET | Refills: 1 | Status: SHIPPED | OUTPATIENT
Start: 2024-12-03

## 2024-12-09 ENCOUNTER — ANTICOAGULATION - WARFARIN VISIT (OUTPATIENT)
Dept: PRIMARY CARE | Facility: CLINIC | Age: 84
End: 2024-12-09
Payer: MEDICARE

## 2024-12-09 ENCOUNTER — TELEPHONE (OUTPATIENT)
Dept: PRIMARY CARE | Facility: CLINIC | Age: 84
End: 2024-12-09
Payer: MEDICARE

## 2024-12-09 LAB
INR IN PPP BY COAGULATION ASSAY EXTERNAL: 1.8 (ref 2–3)
PROTHROMBIN TIME (PT) IN PPP BY COAGULATION ASSAY EXTERNAL: ABNORMAL

## 2024-12-10 ENCOUNTER — APPOINTMENT (OUTPATIENT)
Dept: PRIMARY CARE | Facility: CLINIC | Age: 84
End: 2024-12-10
Payer: MEDICARE

## 2024-12-16 ENCOUNTER — ANTICOAGULATION - WARFARIN VISIT (OUTPATIENT)
Dept: CARDIOLOGY | Facility: HOSPITAL | Age: 84
End: 2024-12-16
Payer: MEDICARE

## 2024-12-16 DIAGNOSIS — I48.0 PAROXYSMAL ATRIAL FIBRILLATION (MULTI): ICD-10-CM

## 2024-12-16 LAB
INR IN PPP BY COAGULATION ASSAY EXTERNAL: 1.5
PROTHROMBIN TIME (PT) IN PPP BY COAGULATION ASSAY EXTERNAL: NORMAL

## 2024-12-16 NOTE — PROGRESS NOTES
Received home results of 1.5. Spoke with  who has been giving her 3mg daily. Will increase todays dose to 6mg and he will retest next week

## 2024-12-23 ENCOUNTER — ANTICOAGULATION - WARFARIN VISIT (OUTPATIENT)
Dept: CARDIOLOGY | Facility: HOSPITAL | Age: 84
End: 2024-12-23
Payer: MEDICARE

## 2024-12-23 DIAGNOSIS — I48.0 PAROXYSMAL ATRIAL FIBRILLATION (MULTI): ICD-10-CM

## 2024-12-23 LAB
INR IN PPP BY COAGULATION ASSAY EXTERNAL: 1.7
PROTHROMBIN TIME (PT) IN PPP BY COAGULATION ASSAY EXTERNAL: NORMAL

## 2024-12-23 NOTE — PROGRESS NOTES
Spoke with  with home INR result of 1.7. Dosing verified and will increase this week. He will test again on Monday

## 2025-01-06 ENCOUNTER — ANTICOAGULATION - WARFARIN VISIT (OUTPATIENT)
Dept: CARDIOLOGY | Facility: HOSPITAL | Age: 85
End: 2025-01-06
Payer: MEDICARE

## 2025-01-06 DIAGNOSIS — I48.0 PAROXYSMAL ATRIAL FIBRILLATION (MULTI): ICD-10-CM

## 2025-01-06 LAB
INR IN PPP BY COAGULATION ASSAY EXTERNAL: 1.8
PROTHROMBIN TIME (PT) IN PPP BY COAGULATION ASSAY EXTERNAL: NORMAL

## 2025-01-06 NOTE — PROGRESS NOTES
Spoke with  with today's INR result of 1.8. He admits to having trouble testing this morning, so may have been some errors involved. Will maintain dosing of 27 mg weekly, but change days of 6 mg to Tuesday and Thursdays, and trest again next week

## 2025-01-13 ENCOUNTER — ANTICOAGULATION - WARFARIN VISIT (OUTPATIENT)
Dept: CARDIOLOGY | Facility: HOSPITAL | Age: 85
End: 2025-01-13
Payer: MEDICARE

## 2025-01-13 DIAGNOSIS — I48.0 PAROXYSMAL ATRIAL FIBRILLATION (MULTI): ICD-10-CM

## 2025-01-13 LAB
INR IN PPP BY COAGULATION ASSAY EXTERNAL: 2.1
PROTHROMBIN TIME (PT) IN PPP BY COAGULATION ASSAY EXTERNAL: NORMAL

## 2025-01-13 NOTE — PROGRESS NOTES
Called patient with home INR result of 2.1. Dosing confirmed with . No changes in medication or diet. They will maintain current dosing and retest next week

## 2025-01-20 ENCOUNTER — ANTICOAGULATION - OTHER VISIT (DOAC) (OUTPATIENT)
Dept: CARDIOLOGY | Facility: HOSPITAL | Age: 85
End: 2025-01-20
Payer: MEDICARE

## 2025-01-20 DIAGNOSIS — I48.0 PAROXYSMAL ATRIAL FIBRILLATION (MULTI): Primary | ICD-10-CM

## 2025-01-20 LAB
INR IN PPP BY COAGULATION ASSAY EXTERNAL: 2.2
PROTHROMBIN TIME (PT) IN PPP BY COAGULATION ASSAY EXTERNAL: NORMAL

## 2025-01-20 NOTE — PROGRESS NOTES
Called patient with home care results of 2.2. Confirmed dosing and  read back. They will test again next week

## 2025-01-27 ENCOUNTER — ANTICOAGULATION - WARFARIN VISIT (OUTPATIENT)
Dept: CARDIOLOGY | Facility: HOSPITAL | Age: 85
End: 2025-01-27
Payer: MEDICARE

## 2025-01-27 DIAGNOSIS — I48.0 PAROXYSMAL ATRIAL FIBRILLATION (MULTI): ICD-10-CM

## 2025-01-27 NOTE — PROGRESS NOTES
Called  with today's home INR result of 2.1. Dosing reviewed and confirmed. He will maintain dosing and test again next week. No changes to diet or medications

## 2025-02-06 DIAGNOSIS — I48.0 PAROXYSMAL ATRIAL FIBRILLATION (MULTI): ICD-10-CM

## 2025-02-07 RX ORDER — WARFARIN 3 MG/1
TABLET ORAL
Qty: 65 TABLET | Refills: 3 | Status: SHIPPED | OUTPATIENT
Start: 2025-02-07

## 2025-02-10 ENCOUNTER — ANTICOAGULATION - WARFARIN VISIT (OUTPATIENT)
Dept: CARDIOLOGY | Facility: HOSPITAL | Age: 85
End: 2025-02-10
Payer: MEDICARE

## 2025-02-10 DIAGNOSIS — I48.0 PAROXYSMAL ATRIAL FIBRILLATION (MULTI): ICD-10-CM

## 2025-02-10 LAB
INR IN PPP BY COAGULATION ASSAY EXTERNAL: 2.4
PROTHROMBIN TIME (PT) IN PPP BY COAGULATION ASSAY EXTERNAL: NORMAL

## 2025-02-17 ENCOUNTER — ANTICOAGULATION - WARFARIN VISIT (OUTPATIENT)
Dept: CARDIOLOGY | Facility: HOSPITAL | Age: 85
End: 2025-02-17
Payer: MEDICARE

## 2025-02-17 DIAGNOSIS — G62.9 NEUROPATHY: ICD-10-CM

## 2025-02-17 DIAGNOSIS — I48.0 PAROXYSMAL ATRIAL FIBRILLATION (MULTI): ICD-10-CM

## 2025-02-17 RX ORDER — GABAPENTIN 300 MG/1
300 CAPSULE ORAL 2 TIMES DAILY
Qty: 180 CAPSULE | Refills: 1 | Status: SHIPPED | OUTPATIENT
Start: 2025-02-17

## 2025-02-17 NOTE — PROGRESS NOTES
Spoke with  regarding home INR result of 2.4. Dosing reviewed and read back. Will test again next week

## 2025-02-24 ENCOUNTER — ANTICOAGULATION - WARFARIN VISIT (OUTPATIENT)
Dept: CARDIOLOGY | Facility: HOSPITAL | Age: 85
End: 2025-02-24
Payer: MEDICARE

## 2025-02-24 DIAGNOSIS — I48.0 PAROXYSMAL ATRIAL FIBRILLATION (MULTI): ICD-10-CM

## 2025-02-24 NOTE — PROGRESS NOTES
Spoke with  regarding today's INR result of 2.1. NO changes to diet or medication and dosing confirmed. He will test her again next week

## 2025-03-03 ENCOUNTER — ANTICOAGULATION - WARFARIN VISIT (OUTPATIENT)
Dept: CARDIOLOGY | Facility: HOSPITAL | Age: 85
End: 2025-03-03
Payer: MEDICARE

## 2025-03-03 DIAGNOSIS — I48.0 PAROXYSMAL ATRIAL FIBRILLATION (MULTI): ICD-10-CM

## 2025-03-03 LAB
INR IN PPP BY COAGULATION ASSAY EXTERNAL: 2.3
PROTHROMBIN TIME (PT) IN PPP BY COAGULATION ASSAY EXTERNAL: NORMAL

## 2025-03-03 NOTE — PROGRESS NOTES
Spoke with  regarding today's home INR of 2.3. She was sick last week and may have vomited up the warfarin, so he  gave her another when she was feeling better. He will test her again next week

## 2025-03-10 ENCOUNTER — ANTICOAGULATION - WARFARIN VISIT (OUTPATIENT)
Dept: CARDIOLOGY | Facility: HOSPITAL | Age: 85
End: 2025-03-10
Payer: MEDICARE

## 2025-03-10 DIAGNOSIS — I48.0 PAROXYSMAL ATRIAL FIBRILLATION (MULTI): ICD-10-CM

## 2025-03-10 LAB
INR IN PPP BY COAGULATION ASSAY EXTERNAL: 2.7
PROTHROMBIN TIME (PT) IN PPP BY COAGULATION ASSAY EXTERNAL: NORMAL

## 2025-03-10 NOTE — PROGRESS NOTES
Called  with today's home INR result of 2.7. Dosing reviewed and confirmed, he will test again next week

## 2025-03-17 ENCOUNTER — ANTICOAGULATION - WARFARIN VISIT (OUTPATIENT)
Dept: CARDIOLOGY | Facility: HOSPITAL | Age: 85
End: 2025-03-17
Payer: MEDICARE

## 2025-03-17 DIAGNOSIS — I48.0 PAROXYSMAL ATRIAL FIBRILLATION (MULTI): Primary | ICD-10-CM

## 2025-03-24 ENCOUNTER — ANTICOAGULATION - WARFARIN VISIT (OUTPATIENT)
Dept: CARDIOLOGY | Facility: HOSPITAL | Age: 85
End: 2025-03-24
Payer: MEDICARE

## 2025-03-24 DIAGNOSIS — I48.0 PAROXYSMAL ATRIAL FIBRILLATION (MULTI): ICD-10-CM

## 2025-03-24 NOTE — PROGRESS NOTES
Spoke with  with today's home INR result of 1.7. He had trouble obtaining a sample today. Dosing confirmed and no missed doses. He will test again next week

## 2025-04-04 ENCOUNTER — ANTICOAGULATION - WARFARIN VISIT (OUTPATIENT)
Dept: CARDIOLOGY | Facility: HOSPITAL | Age: 85
End: 2025-04-04
Payer: MEDICARE

## 2025-04-04 DIAGNOSIS — I48.0 PAROXYSMAL ATRIAL FIBRILLATION (MULTI): Primary | ICD-10-CM

## 2025-04-07 ENCOUNTER — ANTICOAGULATION - WARFARIN VISIT (OUTPATIENT)
Dept: CARDIOLOGY | Facility: HOSPITAL | Age: 85
End: 2025-04-07
Payer: MEDICARE

## 2025-04-07 DIAGNOSIS — I48.0 PAROXYSMAL ATRIAL FIBRILLATION (MULTI): ICD-10-CM

## 2025-04-14 ENCOUNTER — ANTICOAGULATION - WARFARIN VISIT (OUTPATIENT)
Dept: CARDIOLOGY | Facility: HOSPITAL | Age: 85
End: 2025-04-14
Payer: MEDICARE

## 2025-04-14 DIAGNOSIS — I48.0 PAROXYSMAL ATRIAL FIBRILLATION (MULTI): ICD-10-CM

## 2025-04-14 LAB
POC INR: 2.5
POC PROTHROMBIN TIME: NORMAL

## 2025-04-14 PROCEDURE — 85610 PROTHROMBIN TIME: CPT | Mod: QW

## 2025-04-14 PROCEDURE — 99211 OFF/OP EST MAY X REQ PHY/QHP: CPT

## 2025-04-14 NOTE — PROGRESS NOTES
Patient identification verified with 2 identifiers.    Location: [facility] - 1st Floor Anticoagulation Clinic [address] [id number]    Referring Physician: BREANN Phelps  Enrollment/ Re-enrollment date:    INR Goal: 2.0-3.0  INR monitoring is per [facility] protocol.  Anticoagulation Medication: warfarin  Indication: Atrial Fibrillation/Atrial Flutter    Subjective   Bleeding signs/symptoms: No    Bruising: No   Major bleeding event: No  Thrombosis signs/symptoms: No  Thromboembolic event: No  Missed doses: No  Extra doses: No  Medication changes: No  Dietary changes: No  Change in health: No  Change in activity: No  Alcohol: No  Other concerns: No    Upcoming Procedures:  Does the Patient Have any upcoming procedures that require interruption in anticoagulation therapy? no  Does the patient require bridging? no      Anticoagulation Summary  As of 2025      INR goal:  2.0-3.0   TTR:  62.5% (3.9 mo)   INR used for dosin.50 (2025)   Weekly warfarin total:  27 mg               Assessment/Plan   Therapeutic     1. New dose: no change    2. Next INR: 1 month      Education provided to patient during the visit:  Patient instructed to call in interim with questions, concerns and changes.

## 2025-04-23 ENCOUNTER — ANTICOAGULATION - WARFARIN VISIT (OUTPATIENT)
Dept: CARDIOLOGY | Facility: HOSPITAL | Age: 85
End: 2025-04-23
Payer: MEDICARE

## 2025-04-23 DIAGNOSIS — I48.0 PAROXYSMAL ATRIAL FIBRILLATION (MULTI): ICD-10-CM

## 2025-04-23 LAB
INR IN PPP BY COAGULATION ASSAY EXTERNAL: 1.9
PROTHROMBIN TIME (PT) IN PPP BY COAGULATION ASSAY EXTERNAL: NORMAL

## 2025-04-23 NOTE — PROGRESS NOTES
Spoke with  regarding home INR of 1.9. He denies changes to diet or medication and will maintain current dosing. Would like to follow in clinic at least while the weather is nice, message sent to [facility] [employer] rep to see if a different mor user friendly machine is available to them

## 2025-05-03 DIAGNOSIS — E78.2 MIXED HYPERLIPIDEMIA: ICD-10-CM

## 2025-05-05 ENCOUNTER — ANTICOAGULATION - WARFARIN VISIT (OUTPATIENT)
Dept: CARDIOLOGY | Facility: HOSPITAL | Age: 85
End: 2025-05-05
Payer: MEDICARE

## 2025-05-05 DIAGNOSIS — I48.0 PAROXYSMAL ATRIAL FIBRILLATION (MULTI): ICD-10-CM

## 2025-05-05 LAB
POC INR: 2 (ref 0.9–1.1)
POC PROTHROMBIN TIME: ABNORMAL (ref 9.3–12.5)

## 2025-05-05 PROCEDURE — 85610 PROTHROMBIN TIME: CPT | Mod: QW

## 2025-05-05 PROCEDURE — 99211 OFF/OP EST MAY X REQ PHY/QHP: CPT

## 2025-05-05 RX ORDER — SIMVASTATIN 40 MG/1
40 TABLET, FILM COATED ORAL NIGHTLY
Qty: 90 TABLET | Refills: 1 | Status: SHIPPED | OUTPATIENT
Start: 2025-05-05

## 2025-05-05 NOTE — PROGRESS NOTES
Patient identification verified with 2 identifiers.    Location: [facility] - 1st Floor Anticoagulation Clinic [address] [id number]    Referring Physician: DR Phelps  Enrollment/ Re-enrollment date:    INR Goal: 2.0-3.0  INR monitoring is per [facility] protocol.  Anticoagulation Medication: warfarin  Indication: Atrial Fibrillation/Atrial Flutter    Subjective   Bleeding signs/symptoms: No    Bruising: No   Major bleeding event: No  Thrombosis signs/symptoms: No  Thromboembolic event: No  Missed doses: No  Extra doses: No  Medication changes: No  Dietary changes: No  Change in health: No  Change in activity: No  Alcohol: No  Other concerns: No    Upcoming Procedures:  Does the Patient Have any upcoming procedures that require interruption in anticoagulation therapy? no  Does the patient require bridging? no      Anticoagulation Summary  As of 2025      INR goal:  2.0-3.0   TTR:  58.2% (4.6 mo)   INR used for dosin.00 (2025)   Weekly warfarin total:  27 mg               Assessment/Plan   Therapeutic     1. New dose: no change    2. Next INR: 1 month      Education provided to patient during the visit:  Patient instructed to call in interim with questions, concerns and changes.

## 2025-05-12 LAB
INR IN PPP BY COAGULATION ASSAY EXTERNAL: 2
PROTHROMBIN TIME (PT) IN PPP BY COAGULATION ASSAY EXTERNAL: NORMAL

## 2025-05-14 ENCOUNTER — ANTICOAGULATION - WARFARIN VISIT (OUTPATIENT)
Dept: CARDIOLOGY | Facility: HOSPITAL | Age: 85
End: 2025-05-14
Payer: MEDICARE

## 2025-05-14 DIAGNOSIS — I48.0 PAROXYSMAL ATRIAL FIBRILLATION (MULTI): ICD-10-CM

## 2025-05-14 NOTE — PROGRESS NOTES
Called with home INR result of 2.0. unable to reach patient, phone line rings busy for past 2 days. Will test again Monday

## 2025-05-19 LAB
INR IN PPP BY COAGULATION ASSAY EXTERNAL: 2.6
PROTHROMBIN TIME (PT) IN PPP BY COAGULATION ASSAY EXTERNAL: NORMAL

## 2025-05-21 ENCOUNTER — ANTICOAGULATION - WARFARIN VISIT (OUTPATIENT)
Dept: CARDIOLOGY | Facility: HOSPITAL | Age: 85
End: 2025-05-21
Payer: MEDICARE

## 2025-05-21 DIAGNOSIS — I48.0 PAROXYSMAL ATRIAL FIBRILLATION (MULTI): ICD-10-CM

## 2025-05-21 NOTE — PROGRESS NOTES
Spoke with  with home INR result of 2.6. They received a new machine that is easier to use, and his daughter has been coming over to help. Maintaining dose and will test again next week

## 2025-05-29 DIAGNOSIS — R42 DIZZINESS: ICD-10-CM

## 2025-05-29 RX ORDER — MECLIZINE HYDROCHLORIDE 25 MG/1
TABLET ORAL
Qty: 90 TABLET | Refills: 1 | Status: SHIPPED | OUTPATIENT
Start: 2025-05-29

## 2025-06-04 ENCOUNTER — ANTICOAGULATION - WARFARIN VISIT (OUTPATIENT)
Dept: CARDIOLOGY | Facility: HOSPITAL | Age: 85
End: 2025-06-04
Payer: MEDICARE

## 2025-06-04 DIAGNOSIS — I48.0 PAROXYSMAL ATRIAL FIBRILLATION (MULTI): ICD-10-CM

## 2025-06-10 ENCOUNTER — ANTICOAGULATION - WARFARIN VISIT (OUTPATIENT)
Dept: CARDIOLOGY | Facility: HOSPITAL | Age: 85
End: 2025-06-10
Payer: MEDICARE

## 2025-06-10 ENCOUNTER — OFFICE VISIT (OUTPATIENT)
Dept: PRIMARY CARE | Facility: CLINIC | Age: 85
End: 2025-06-10
Payer: MEDICARE

## 2025-06-10 VITALS
SYSTOLIC BLOOD PRESSURE: 138 MMHG | OXYGEN SATURATION: 97 % | RESPIRATION RATE: 16 BRPM | HEIGHT: 63 IN | HEART RATE: 94 BPM | DIASTOLIC BLOOD PRESSURE: 84 MMHG | BODY MASS INDEX: 34.87 KG/M2 | WEIGHT: 196.8 LBS

## 2025-06-10 DIAGNOSIS — I48.0 PAROXYSMAL ATRIAL FIBRILLATION (MULTI): ICD-10-CM

## 2025-06-10 DIAGNOSIS — N18.32 STAGE 3B CHRONIC KIDNEY DISEASE (MULTI): ICD-10-CM

## 2025-06-10 DIAGNOSIS — Z00.00 MEDICARE ANNUAL WELLNESS VISIT, SUBSEQUENT: ICD-10-CM

## 2025-06-10 DIAGNOSIS — F20.9 SCHIZOPHRENIA, UNSPECIFIED TYPE: ICD-10-CM

## 2025-06-10 DIAGNOSIS — E78.2 MIXED HYPERLIPIDEMIA: Primary | ICD-10-CM

## 2025-06-10 DIAGNOSIS — E55.9 VITAMIN D DEFICIENCY: ICD-10-CM

## 2025-06-10 DIAGNOSIS — R73.01 ELEVATED FASTING GLUCOSE: ICD-10-CM

## 2025-06-10 DIAGNOSIS — E66.01 OBESITY, MORBID (MULTI): ICD-10-CM

## 2025-06-10 DIAGNOSIS — Z13.29 SCREENING FOR THYROID DISORDER: ICD-10-CM

## 2025-06-10 DIAGNOSIS — H61.23 CERUMEN DEBRIS ON TYMPANIC MEMBRANE OF BOTH EARS: ICD-10-CM

## 2025-06-10 DIAGNOSIS — I48.0 PAROXYSMAL ATRIAL FIBRILLATION (MULTI): Primary | ICD-10-CM

## 2025-06-10 LAB
INR IN PPP BY COAGULATION ASSAY EXTERNAL: 2.8
PROTHROMBIN TIME (PT) IN PPP BY COAGULATION ASSAY EXTERNAL: NORMAL

## 2025-06-10 PROCEDURE — 1159F MED LIST DOCD IN RCRD: CPT

## 2025-06-10 PROCEDURE — 1170F FXNL STATUS ASSESSED: CPT

## 2025-06-10 PROCEDURE — 1124F ACP DISCUSS-NO DSCNMKR DOCD: CPT

## 2025-06-10 PROCEDURE — 1125F AMNT PAIN NOTED PAIN PRSNT: CPT

## 2025-06-10 PROCEDURE — 1036F TOBACCO NON-USER: CPT

## 2025-06-10 PROCEDURE — G0439 PPPS, SUBSEQ VISIT: HCPCS

## 2025-06-10 PROCEDURE — 99215 OFFICE O/P EST HI 40 MIN: CPT

## 2025-06-10 PROCEDURE — 1157F ADVNC CARE PLAN IN RCRD: CPT

## 2025-06-10 RX ORDER — WARFARIN 6 MG/1
6 TABLET ORAL 2 TIMES WEEKLY
Qty: 24 TABLET | Refills: 1 | Status: SHIPPED | OUTPATIENT
Start: 2025-06-12

## 2025-06-10 RX ORDER — WARFARIN 6 MG/1
6 TABLET ORAL 2 TIMES WEEKLY
COMMUNITY
End: 2025-06-10 | Stop reason: SDUPTHER

## 2025-06-10 ASSESSMENT — ACTIVITIES OF DAILY LIVING (ADL)
PREPARING MEALS: NEEDS ASSISTANCE
TOILETING: NEEDS ASSISTANCE
PILL BOX USED: NO
JUDGMENT_ADEQUATE_SAFELY_COMPLETE_DAILY_ACTIVITIES: YES
TOILETING: NEEDS ASSISTANCE
ASSISTIVE_DEVICE: WHEELCHAIR
GROCERY SHOPPING: NEEDS ASSISTANCE
PATIENT'S MEMORY ADEQUATE TO SAFELY COMPLETE DAILY ACTIVITIES?: YES
EATING: INDEPENDENT
MANAGING FINANCES: INDEPENDENT
DRESSING: NEEDS ASSISTANCE
DOING HOUSEWORK: NEEDS ASSISTANCE
STIL DRIVING: NO
USING TELEPHONE: INDEPENDENT
HEARING - RIGHT EAR: FUNCTIONAL
GROOMING: NEEDS ASSISTANCE
WALKS IN HOME: NEEDS ASSISTANCE
JUDGMENT_ADEQUATE_SAFELY_COMPLETE_DAILY_ACTIVITIES: YES
FEEDING YOURSELF: INDEPENDENT
NEEDS ASSISTANCE WITH FOOD: INDEPENDENT
TAKING MEDICATION: INDEPENDENT
USING TRANSPORTATION: NEEDS ASSISTANCE
BATHING: NEEDS ASSISTANCE
BATHING: NEEDS ASSISTANCE
ADEQUATE_TO_COMPLETE_ADL: YES
DRESSING YOURSELF: NEEDS ASSISTANCE
HEARING - LEFT EAR: FUNCTIONAL
FEEDING: INDEPENDENT
ADEQUATE_TO_COMPLETE_ADL: YES

## 2025-06-10 ASSESSMENT — PAIN SCALES - GENERAL: PAINLEVEL_OUTOF10: 5

## 2025-06-10 ASSESSMENT — ENCOUNTER SYMPTOMS
LOSS OF SENSATION IN FEET: 0
DEPRESSION: 0
OCCASIONAL FEELINGS OF UNSTEADINESS: 0

## 2025-06-10 ASSESSMENT — PATIENT HEALTH QUESTIONNAIRE - PHQ9
1. LITTLE INTEREST OR PLEASURE IN DOING THINGS: NOT AT ALL
SUM OF ALL RESPONSES TO PHQ9 QUESTIONS 1 AND 2: 0
2. FEELING DOWN, DEPRESSED OR HOPELESS: NOT AT ALL

## 2025-06-10 NOTE — PROGRESS NOTES
"Subjective   Reason for Visit: Desiree Green is an 84 y.o. female here for a Medicare Wellness visit.     Past Medical, Surgical, and Family History reviewed and updated in chart.    Reviewed all medications by prescribing practitioner or clinical pharmacist (such as prescriptions, OTCs, herbal therapies and supplements) and documented in the medical record.    HPI  Accompanied by daughter     Patient denies any falls, urgent care, ER, hospitalization, new diagnoses, surgeries since they were here last.    Denies any issues with chest pain, chest pressure, shortness of breath, constipation, diarrhea, blood in stool, urinary urgency, frequency, blood in urine, muscle weakness in arms and legs, numbness or tingling in fingers or toes.  Zara  Can not do without stelazine   Daughter wanted this in her chart,     Patient Care Team:  ABRAHAM Martinez-CNP as PCP - General (Internal Medicine)     Review of Systems  Review of Systems negative except as noted in HPI and Chief complaint.  Current Medications[1]    Objective   Vitals:  /84 (BP Location: Left arm, Patient Position: Sitting, BP Cuff Size: Adult)   Pulse 94   Resp 16   Ht 1.6 m (5' 3\")   Wt 89.3 kg (196 lb 12.8 oz)   SpO2 97%   BMI 34.86 kg/m²       Physical Exam  Vitals reviewed.   Constitutional:       General: She is not in acute distress.     Appearance: Normal appearance.   HENT:      Head: Normocephalic.      Right Ear: Tympanic membrane normal. There is impacted cerumen.      Left Ear: There is impacted cerumen.      Nose: Nose normal.      Mouth/Throat:      Mouth: Mucous membranes are moist.      Pharynx: Oropharynx is clear.   Eyes:      Extraocular Movements: Extraocular movements intact.      Conjunctiva/sclera: Conjunctivae normal.      Pupils: Pupils are equal, round, and reactive to light.   Cardiovascular:      Rate and Rhythm: Normal rate. Rhythm irregular.      Pulses: Normal pulses.      Heart sounds: Normal heart sounds. "   Pulmonary:      Effort: Pulmonary effort is normal.      Breath sounds: Normal breath sounds.   Abdominal:      General: Abdomen is flat. Bowel sounds are normal.      Palpations: Abdomen is soft.   Musculoskeletal:         General: Normal range of motion.   Skin:     General: Skin is warm and dry.      Capillary Refill: Capillary refill takes 2 to 3 seconds.   Neurological:      General: No focal deficit present.      Mental Status: She is alert and oriented to person, place, and time. Mental status is at baseline.      Cranial Nerves: Cranial nerves 2-12 are intact.   Psychiatric:         Mood and Affect: Mood normal.         Behavior: Behavior is cooperative.         Assessment & Plan  Paroxysmal atrial fibrillation (Multi)  Continue follow up with the coumadin clinic weekly for INR checks  Obesity, morbid (Multi)   monitor caloric intake by keeping a daily log of all food you are eating or drinking.  My fitness pal is a great application you can use to monitor calorie intake.  Increase activity walking 150 minutes/week is highly recommended.  Joining the local Y or gym if feasible also recommended.      Stage 3b chronic kidney disease (Multi)    Mixed hyperlipidemia      Decrease intake of saturated fats, fast food, sweets.  Increase intake of fresh fruit fresh vegetables and lean meats.  Increase healthy fats seeds, nuts, olive oil instead of butter.  walk 150 minutes/week for heart health.   Aim for 25-30 grams of fiber in your diet daily.  May consider adding Fish Oil supplement 1,200 mg per day or Omega 3 Supplement daily.      Vitamin D deficiency    Orders:    Vitamin D 25-Hydroxy,Total (for eval of Vitamin D levels); Future    Medicare annual wellness visit, subsequent  LAB Order/ BLOOD TESTS   I have ordered lab work for you to get done. This should be fasting. Nothing to eat or drink after midnight besides black tea,  black coffee, or water. If you do not hear from this office within two days of  having your labs done, please call for your results.   Please call to schedule an appointment:   [facility] Scheduling phone number is [phone]  You can also schedule an appointment online by logging into   [employer]    Screening for thyroid disorder    Orders:    TSH with reflex to Free T4 if abnormal; Future    Elevated fasting glucose    Orders:    Hemoglobin A1C; Future    Body mass index (BMI) 34.0-34.9, adult    Orders:    CBC and Auto Differential; Future    Cerumen debris on tympanic membrane of both ears    Orders:    Ear Cerumen Removal; Future       This note was dictated using DRAGON speech recognition software and was corrected for spelling or grammatical errors, but despite proofreading several typographical errors might be present that might affect the meaning of the content.  Vianey Phelps CNP    Advanced care planning was discussed with Desiree Green today. We reviewed code status, Medical Power of , and Living will. Pt has LW or POA.          [1]   Current Outpatient Medications:     gabapentin (Neurontin) 300 mg capsule, TAKE 1 CAPSULE BY MOUTH TWICE  DAILY, Disp: 180 capsule, Rfl: 1    meclizine (Antivert) 25 mg tablet, TAKE 1 TABLET BY MOUTH ONCE  DAILY AS NEEDED FOR DIZZINESS, Disp: 90 tablet, Rfl: 1    nortriptyline (Pamelor) 25 mg capsule, Take 1 capsule (25 mg) by mouth once daily at bedtime., Disp: , Rfl:     simvastatin (Zocor) 40 mg tablet, TAKE 1 TABLET BY MOUTH ONCE  DAILY AT BEDTIME, Disp: 90 tablet, Rfl: 1    trifluoperazine (Stelazine) 5 mg tablet, once every 24 hours. TAKE 1 TABLET M/W/F/SAT AND 2 TABLETS T/TR/SUN, Disp: , Rfl:     warfarin (Coumadin) 3 mg tablet, TAKE 1 TABLET BY MOUTH 5 TIMES A WEEK ON MONDAY, WEDNESDAY,  THURSDAY, SATURDAY AND SUNDAY AS DIRECTED, Disp: 65 tablet, Rfl: 3    [START ON 6/12/2025] warfarin (Coumadin) 6 mg tablet, Take 1 tablet (6 mg) by mouth 2 times a week. Take one 6mg tab on Tuesday and one 6mg on Friday. 3mg all other days  of the week, Disp: 24 tablet, Rfl: 1

## 2025-06-10 NOTE — ASSESSMENT & PLAN NOTE
monitor caloric intake by keeping a daily log of all food you are eating or drinking.  My fitness pal is a great application you can use to monitor calorie intake.  Increase activity walking 150 minutes/week is highly recommended.  Joining the local Y or gym if feasible also recommended.

## 2025-06-11 LAB
25(OH)D3+25(OH)D2 SERPL-MCNC: 95 NG/ML (ref 30–100)
ALBUMIN SERPL-MCNC: 4.2 G/DL (ref 3.6–5.1)
ALBUMIN/CREAT UR: 7 MG/G CREAT
ALP SERPL-CCNC: 79 U/L (ref 37–153)
ALT SERPL-CCNC: 11 U/L (ref 6–29)
ANION GAP SERPL CALCULATED.4IONS-SCNC: 12 MMOL/L (CALC) (ref 7–17)
AST SERPL-CCNC: 20 U/L (ref 10–35)
BASOPHILS # BLD AUTO: 77 CELLS/UL (ref 0–200)
BASOPHILS NFR BLD AUTO: 1.1 %
BILIRUB SERPL-MCNC: 0.8 MG/DL (ref 0.2–1.2)
BUN SERPL-MCNC: 16 MG/DL (ref 7–25)
CALCIUM SERPL-MCNC: 9.3 MG/DL (ref 8.6–10.4)
CHLORIDE SERPL-SCNC: 106 MMOL/L (ref 98–110)
CHOLEST SERPL-MCNC: 170 MG/DL
CHOLEST/HDLC SERPL: 2.7 (CALC)
CO2 SERPL-SCNC: 23 MMOL/L (ref 20–32)
CREAT SERPL-MCNC: 1.03 MG/DL (ref 0.6–0.95)
CREAT UR-MCNC: 221 MG/DL (ref 20–275)
EGFRCR SERPLBLD CKD-EPI 2021: 54 ML/MIN/1.73M2
EOSINOPHIL # BLD AUTO: 70 CELLS/UL (ref 15–500)
EOSINOPHIL NFR BLD AUTO: 1 %
ERYTHROCYTE [DISTWIDTH] IN BLOOD BY AUTOMATED COUNT: 15.7 % (ref 11–15)
EST. AVERAGE GLUCOSE BLD GHB EST-MCNC: 123 MG/DL
EST. AVERAGE GLUCOSE BLD GHB EST-SCNC: 6.8 MMOL/L
GLUCOSE SERPL-MCNC: 101 MG/DL (ref 65–99)
HBA1C MFR BLD: 5.9 %
HCT VFR BLD AUTO: 44.3 % (ref 35–45)
HDLC SERPL-MCNC: 63 MG/DL
HGB BLD-MCNC: 13.9 G/DL (ref 11.7–15.5)
LDLC SERPL CALC-MCNC: 84 MG/DL (CALC)
LYMPHOCYTES # BLD AUTO: 1295 CELLS/UL (ref 850–3900)
LYMPHOCYTES NFR BLD AUTO: 18.5 %
MCH RBC QN AUTO: 30 PG (ref 27–33)
MCHC RBC AUTO-ENTMCNC: 31.4 G/DL (ref 32–36)
MCV RBC AUTO: 95.7 FL (ref 80–100)
MICROALBUMIN UR-MCNC: 1.6 MG/DL
MONOCYTES # BLD AUTO: 532 CELLS/UL (ref 200–950)
MONOCYTES NFR BLD AUTO: 7.6 %
NEUTROPHILS # BLD AUTO: 5026 CELLS/UL (ref 1500–7800)
NEUTROPHILS NFR BLD AUTO: 71.8 %
NONHDLC SERPL-MCNC: 107 MG/DL (CALC)
PLATELET # BLD AUTO: 396 THOUSAND/UL (ref 140–400)
PMV BLD REES-ECKER: 12.3 FL (ref 7.5–12.5)
POTASSIUM SERPL-SCNC: 4.4 MMOL/L (ref 3.5–5.3)
PROT SERPL-MCNC: 6.6 G/DL (ref 6.1–8.1)
RBC # BLD AUTO: 4.63 MILLION/UL (ref 3.8–5.1)
SODIUM SERPL-SCNC: 141 MMOL/L (ref 135–146)
TRIGL SERPL-MCNC: 134 MG/DL
TSH SERPL-ACNC: 1.79 MIU/L (ref 0.4–4.5)
WBC # BLD AUTO: 7 THOUSAND/UL (ref 3.8–10.8)

## 2025-06-16 ENCOUNTER — ANTICOAGULATION - WARFARIN VISIT (OUTPATIENT)
Dept: CARDIOLOGY | Facility: HOSPITAL | Age: 85
End: 2025-06-16
Payer: MEDICARE

## 2025-06-16 NOTE — PROGRESS NOTES
Called  with home INR result of 2.7. She has been  maintaining dose and daughter has been coming over to help with the testing.She will test again next week

## 2025-06-25 ENCOUNTER — ANTICOAGULATION - WARFARIN VISIT (OUTPATIENT)
Dept: CARDIOLOGY | Facility: HOSPITAL | Age: 85
End: 2025-06-25
Payer: MEDICARE

## 2025-06-25 NOTE — PROGRESS NOTES
Called  regarding home INR result of 2.3. no changes to diet or medications, will maintain dosing and test again next week

## 2025-07-21 ENCOUNTER — ANTICOAGULATION - WARFARIN VISIT (OUTPATIENT)
Dept: CARDIOLOGY | Facility: HOSPITAL | Age: 85
End: 2025-07-21
Payer: MEDICARE

## 2025-07-21 LAB
INR IN PPP BY COAGULATION ASSAY EXTERNAL: 3
PROTHROMBIN TIME (PT) IN PPP BY COAGULATION ASSAY EXTERNAL: NORMAL

## 2025-07-28 ENCOUNTER — ANTICOAGULATION - WARFARIN VISIT (OUTPATIENT)
Dept: CARDIOLOGY | Facility: HOSPITAL | Age: 85
End: 2025-07-28
Payer: MEDICARE

## 2025-07-30 DIAGNOSIS — G62.9 NEUROPATHY: ICD-10-CM

## 2025-07-30 RX ORDER — GABAPENTIN 300 MG/1
300 CAPSULE ORAL 2 TIMES DAILY
Qty: 180 CAPSULE | Refills: 1 | Status: SHIPPED | OUTPATIENT
Start: 2025-07-30

## 2025-08-04 DIAGNOSIS — I48.0 PAROXYSMAL ATRIAL FIBRILLATION (MULTI): ICD-10-CM

## 2025-08-05 RX ORDER — WARFARIN 6 MG/1
TABLET ORAL
Qty: 26 TABLET | Refills: 1 | Status: SHIPPED | OUTPATIENT
Start: 2025-08-05

## 2025-08-06 ENCOUNTER — ANTICOAGULATION - WARFARIN VISIT (OUTPATIENT)
Dept: CARDIOLOGY | Facility: HOSPITAL | Age: 85
End: 2025-08-06
Payer: MEDICARE

## 2025-08-11 ENCOUNTER — ANTICOAGULATION - WARFARIN VISIT (OUTPATIENT)
Dept: CARDIOLOGY | Facility: HOSPITAL | Age: 85
End: 2025-08-11
Payer: MEDICARE

## 2025-08-21 ENCOUNTER — ANTICOAGULATION - WARFARIN VISIT (OUTPATIENT)
Dept: CARDIOLOGY | Facility: HOSPITAL | Age: 85
End: 2025-08-21
Payer: MEDICARE

## 2025-08-26 ENCOUNTER — ANTICOAGULATION - WARFARIN VISIT (OUTPATIENT)
Dept: CARDIOLOGY | Facility: HOSPITAL | Age: 85
End: 2025-08-26
Payer: MEDICARE

## 2025-09-03 ENCOUNTER — ANTICOAGULATION - WARFARIN VISIT (OUTPATIENT)
Dept: CARDIOLOGY | Facility: HOSPITAL | Age: 85
End: 2025-09-03
Payer: MEDICARE